# Patient Record
Sex: FEMALE | Race: WHITE | NOT HISPANIC OR LATINO | Employment: OTHER | URBAN - METROPOLITAN AREA
[De-identification: names, ages, dates, MRNs, and addresses within clinical notes are randomized per-mention and may not be internally consistent; named-entity substitution may affect disease eponyms.]

---

## 2017-01-05 ENCOUNTER — HISTORICAL (OUTPATIENT)
Dept: LAB | Facility: HOSPITAL | Age: 77
End: 2017-01-05

## 2017-02-07 ENCOUNTER — HISTORICAL (OUTPATIENT)
Dept: LAB | Facility: HOSPITAL | Age: 77
End: 2017-02-07

## 2017-03-06 ENCOUNTER — HISTORICAL (OUTPATIENT)
Dept: LAB | Facility: HOSPITAL | Age: 77
End: 2017-03-06

## 2017-03-13 ENCOUNTER — HISTORICAL (OUTPATIENT)
Dept: LAB | Facility: HOSPITAL | Age: 77
End: 2017-03-13

## 2017-03-22 ENCOUNTER — HISTORICAL (OUTPATIENT)
Dept: LAB | Facility: HOSPITAL | Age: 77
End: 2017-03-22

## 2017-04-12 ENCOUNTER — HISTORICAL (OUTPATIENT)
Dept: LAB | Facility: HOSPITAL | Age: 77
End: 2017-04-12

## 2017-05-02 ENCOUNTER — HISTORICAL (OUTPATIENT)
Dept: LAB | Facility: HOSPITAL | Age: 77
End: 2017-05-02

## 2017-05-02 LAB
INR PPP: 2.52
PROTHROMBIN TIME: 25.7 SECOND(S) (ref 9–11.5)

## 2017-05-31 ENCOUNTER — HISTORICAL (OUTPATIENT)
Dept: LAB | Facility: HOSPITAL | Age: 77
End: 2017-05-31

## 2017-05-31 LAB
INR PPP: 1.98
PROTHROMBIN TIME: 20.1 SECOND(S) (ref 9–11.5)

## 2017-06-27 ENCOUNTER — HISTORICAL (OUTPATIENT)
Dept: LAB | Facility: HOSPITAL | Age: 77
End: 2017-06-27

## 2017-06-27 LAB
INR PPP: 2.69
PROTHROMBIN TIME: 27.5 SECOND(S) (ref 9–11.5)

## 2017-07-25 ENCOUNTER — HISTORICAL (OUTPATIENT)
Dept: LAB | Facility: HOSPITAL | Age: 77
End: 2017-07-25

## 2017-07-25 LAB
INR PPP: 3.52
PROTHROMBIN TIME: 36 SECOND(S) (ref 9–11.5)

## 2017-08-02 ENCOUNTER — HISTORICAL (OUTPATIENT)
Dept: LAB | Facility: HOSPITAL | Age: 77
End: 2017-08-02

## 2017-08-02 LAB
INR PPP: 1.32
PROTHROMBIN TIME: 13.3 SECOND(S) (ref 9–11.5)

## 2017-08-17 ENCOUNTER — HISTORICAL (OUTPATIENT)
Dept: LAB | Facility: HOSPITAL | Age: 77
End: 2017-08-17

## 2017-08-17 LAB
INR PPP: 1.86
PROTHROMBIN TIME: 18.9 SECOND(S) (ref 9–11.5)

## 2017-09-13 ENCOUNTER — HISTORICAL (OUTPATIENT)
Dept: LAB | Facility: HOSPITAL | Age: 77
End: 2017-09-13

## 2017-09-13 LAB
INR PPP: 1.33
PROTHROMBIN TIME: 13.6 SECOND(S) (ref 9–11.5)

## 2019-01-28 ENCOUNTER — HISTORICAL (OUTPATIENT)
Dept: LAB | Facility: HOSPITAL | Age: 79
End: 2019-01-28

## 2019-01-28 LAB
ALBUMIN SERPL-MCNC: 3.8 GM/DL (ref 3.4–5)
ALBUMIN/GLOB SERPL: 1 RATIO (ref 1.1–2)
ALP SERPL-CCNC: 61 UNIT/L (ref 46–116)
ALT SERPL-CCNC: 24 UNIT/L (ref 12–78)
AST SERPL-CCNC: 13 UNIT/L (ref 10–37)
BILIRUB SERPL-MCNC: 0.7 MG/DL (ref 0.2–1)
BILIRUBIN DIRECT+TOT PNL SERPL-MCNC: 0.22 MG/DL (ref 0–0.2)
BILIRUBIN DIRECT+TOT PNL SERPL-MCNC: 0.48 MG/DL
BUN SERPL-MCNC: 17 MG/DL (ref 7–18)
CALCIUM SERPL-MCNC: 9.3 MG/DL (ref 8.5–10.1)
CHLORIDE SERPL-SCNC: 103 MMOL/L (ref 98–107)
CHOLEST SERPL-MCNC: 193 MG/DL (ref 50–200)
CHOLEST/HDLC SERPL: 3 {RATIO} (ref 0–5)
CO2 SERPL-SCNC: 30.9 MMOL/L (ref 21–32)
CREAT SERPL-MCNC: 1.02 MG/DL (ref 0.55–1.02)
GLOBULIN SER-MCNC: 3.8 GM/DL (ref 2.4–3.5)
GLUCOSE SERPL-MCNC: 112 MG/DL (ref 74–106)
HDLC SERPL-MCNC: 64 MG/DL (ref 35–60)
LDLC SERPL CALC-MCNC: 112 MG/DL (ref 50–140)
POTASSIUM SERPL-SCNC: 4.5 MMOL/L (ref 3.5–5.1)
PROT SERPL-MCNC: 7.6 GM/DL (ref 6.4–8.2)
SODIUM SERPL-SCNC: 140 MMOL/L (ref 136–145)
TRIGL SERPL-MCNC: 84 MG/DL (ref 30–150)
TSH SERPL-ACNC: 1.27 MIU/ML (ref 0.35–3.75)
VLDLC SERPL CALC-MCNC: 17 MG/DL

## 2020-01-08 ENCOUNTER — HISTORICAL (OUTPATIENT)
Dept: LAB | Facility: HOSPITAL | Age: 80
End: 2020-01-08

## 2020-01-08 LAB
ALBUMIN SERPL-MCNC: 3.8 GM/DL (ref 3.2–4.6)
ALBUMIN/GLOB SERPL: 1 RATIO (ref 1.1–2)
ALP SERPL-CCNC: 61 UNIT/L (ref 40–150)
ALT SERPL-CCNC: 15 UNIT/L (ref 0–55)
AST SERPL-CCNC: 17 UNIT/L (ref 5–34)
BILIRUB SERPL-MCNC: 0.7 MG/DL
BILIRUBIN DIRECT+TOT PNL SERPL-MCNC: 0.3 MG/DL (ref 0–0.5)
BILIRUBIN DIRECT+TOT PNL SERPL-MCNC: 0.4 MG/DL
BUN SERPL-MCNC: 22 MG/DL (ref 9.8–20.1)
CALCIUM SERPL-MCNC: 9.6 MG/DL (ref 8.4–10.2)
CHLORIDE SERPL-SCNC: 104 MMOL/L (ref 98–107)
CHOLEST SERPL-MCNC: 220 MG/DL
CHOLEST/HDLC SERPL: 4 {RATIO} (ref 0–5)
CO2 SERPL-SCNC: 28 MEQ/L (ref 23–31)
CREAT SERPL-MCNC: 0.96 MG/DL (ref 0.55–1.02)
GLOBULIN SER-MCNC: 3.9 GM/DL (ref 2.4–3.5)
GLUCOSE SERPL-MCNC: 114 MG/DL (ref 82–115)
HDLC SERPL-MCNC: 60 MG/DL
LDLC SERPL CALC-MCNC: 140 MG/DL (ref 50–140)
POTASSIUM SERPL-SCNC: 4.4 MMOL/L (ref 3.5–5.1)
PROT SERPL-MCNC: 7.7 GM/DL (ref 5.8–7.6)
SODIUM SERPL-SCNC: 141 MMOL/L (ref 136–145)
TRIGL SERPL-MCNC: 99 MG/DL (ref 37–140)
TSH SERPL-ACNC: 2.58 UIU/ML (ref 0.35–4.94)
VLDLC SERPL CALC-MCNC: 20 MG/DL

## 2020-06-11 ENCOUNTER — HISTORICAL (OUTPATIENT)
Dept: LAB | Facility: HOSPITAL | Age: 80
End: 2020-06-11

## 2020-06-11 LAB
ALBUMIN SERPL-MCNC: 3.9 GM/DL (ref 3.4–4.8)
ALP SERPL-CCNC: 72 UNIT/L (ref 40–150)
ALT SERPL-CCNC: 14 UNIT/L (ref 0–55)
AST SERPL-CCNC: 15 UNIT/L (ref 5–34)
BILIRUB SERPL-MCNC: 1 MG/DL
BILIRUBIN DIRECT+TOT PNL SERPL-MCNC: 0.4 MG/DL (ref 0–0.5)
BILIRUBIN DIRECT+TOT PNL SERPL-MCNC: 0.6 MG/DL
CHOLEST SERPL-MCNC: 189 MG/DL
CHOLEST/HDLC SERPL: 3 {RATIO} (ref 0–5)
HDLC SERPL-MCNC: 64 MG/DL (ref 35–60)
LDLC SERPL CALC-MCNC: 106 MG/DL (ref 50–140)
PROT SERPL-MCNC: 7.5 GM/DL (ref 5.8–7.6)
TRIGL SERPL-MCNC: 95 MG/DL (ref 37–140)
VLDLC SERPL CALC-MCNC: 19 MG/DL

## 2020-12-07 ENCOUNTER — HISTORICAL (OUTPATIENT)
Dept: RADIOLOGY | Facility: HOSPITAL | Age: 80
End: 2020-12-07

## 2021-08-17 ENCOUNTER — HISTORICAL (OUTPATIENT)
Dept: LAB | Facility: HOSPITAL | Age: 81
End: 2021-08-17

## 2021-08-17 LAB — SARS-COV-2 AG RESP QL IA.RAPID: POSITIVE

## 2021-08-19 ENCOUNTER — HISTORICAL (OUTPATIENT)
Dept: ADMINISTRATIVE | Facility: HOSPITAL | Age: 81
End: 2021-08-19

## 2021-12-23 ENCOUNTER — HISTORICAL (OUTPATIENT)
Dept: LAB | Facility: HOSPITAL | Age: 81
End: 2021-12-23

## 2021-12-23 LAB
APPEARANCE, UA: ABNORMAL
BACTERIA SPEC CULT: ABNORMAL
BILIRUB UR QL STRIP: NEGATIVE
COLOR UR: YELLOW
GLUCOSE (UA): NEGATIVE
HGB UR QL STRIP: NEGATIVE
KETONES UR QL STRIP: NEGATIVE
LEUKOCYTE ESTERASE UR QL STRIP: ABNORMAL
NITRITE UR QL STRIP: POSITIVE
PH UR STRIP: 7 [PH] (ref 4.6–8)
PROT UR QL STRIP: NEGATIVE
RBC #/AREA URNS HPF: ABNORMAL /[HPF]
SP GR UR STRIP: 1.01 (ref 1–1.03)
SQUAMOUS EPITHELIAL, UA: ABNORMAL
UROBILINOGEN UR STRIP-ACNC: 0.2
WBC #/AREA URNS HPF: ABNORMAL /HPF

## 2022-04-11 ENCOUNTER — HISTORICAL (OUTPATIENT)
Dept: ADMINISTRATIVE | Facility: HOSPITAL | Age: 82
End: 2022-04-11

## 2022-04-29 VITALS
OXYGEN SATURATION: 96 % | BODY MASS INDEX: 46.79 KG/M2 | DIASTOLIC BLOOD PRESSURE: 88 MMHG | HEIGHT: 64 IN | WEIGHT: 274.06 LBS | SYSTOLIC BLOOD PRESSURE: 148 MMHG

## 2022-08-03 ENCOUNTER — LAB VISIT (OUTPATIENT)
Dept: LAB | Facility: HOSPITAL | Age: 82
End: 2022-08-03
Attending: FAMILY MEDICINE
Payer: MEDICARE

## 2022-08-03 DIAGNOSIS — I10 HYPERTENSION, ESSENTIAL: Primary | ICD-10-CM

## 2022-08-03 LAB
ALBUMIN SERPL-MCNC: 4 GM/DL (ref 3.4–4.8)
ALBUMIN/GLOB SERPL: 1.1 RATIO (ref 1.1–2)
ALP SERPL-CCNC: 72 UNIT/L (ref 40–150)
ALT SERPL-CCNC: 12 UNIT/L (ref 0–55)
AST SERPL-CCNC: 14 UNIT/L (ref 5–34)
BILIRUBIN DIRECT+TOT PNL SERPL-MCNC: 1 MG/DL
BUN SERPL-MCNC: 25 MG/DL (ref 9.8–20.1)
CALCIUM SERPL-MCNC: 9.5 MG/DL (ref 8.4–10.2)
CHLORIDE SERPL-SCNC: 104 MMOL/L (ref 98–107)
CHOLEST SERPL-MCNC: 186 MG/DL
CHOLEST/HDLC SERPL: 3 {RATIO} (ref 0–5)
CO2 SERPL-SCNC: 27 MMOL/L (ref 23–31)
CREAT SERPL-MCNC: 1.03 MG/DL (ref 0.55–1.02)
GLOBULIN SER-MCNC: 3.6 GM/DL (ref 2.4–3.5)
GLUCOSE SERPL-MCNC: 112 MG/DL (ref 82–115)
HDLC SERPL-MCNC: 58 MG/DL (ref 35–60)
LDLC SERPL CALC-MCNC: 114 MG/DL (ref 50–140)
POTASSIUM SERPL-SCNC: 4.5 MMOL/L (ref 3.5–5.1)
PROT SERPL-MCNC: 7.6 GM/DL (ref 5.8–7.6)
SODIUM SERPL-SCNC: 141 MMOL/L (ref 136–145)
TRIGL SERPL-MCNC: 69 MG/DL (ref 37–140)
TSH SERPL-ACNC: 2.02 UIU/ML (ref 0.35–4.94)
VLDLC SERPL CALC-MCNC: 14 MG/DL

## 2022-08-03 PROCEDURE — 84443 ASSAY THYROID STIM HORMONE: CPT

## 2022-08-03 PROCEDURE — 80053 COMPREHEN METABOLIC PANEL: CPT

## 2022-08-03 PROCEDURE — 80061 LIPID PANEL: CPT

## 2022-08-03 PROCEDURE — 36415 COLL VENOUS BLD VENIPUNCTURE: CPT

## 2022-11-01 ENCOUNTER — OFFICE VISIT (OUTPATIENT)
Dept: FAMILY MEDICINE | Facility: CLINIC | Age: 82
End: 2022-11-01
Payer: MEDICARE

## 2022-11-01 VITALS
WEIGHT: 266.81 LBS | HEIGHT: 61 IN | BODY MASS INDEX: 50.37 KG/M2 | DIASTOLIC BLOOD PRESSURE: 72 MMHG | OXYGEN SATURATION: 97 % | RESPIRATION RATE: 18 BRPM | TEMPERATURE: 97 F | HEART RATE: 84 BPM | SYSTOLIC BLOOD PRESSURE: 177 MMHG

## 2022-11-01 DIAGNOSIS — Z91.81 AT RISK FOR FALLS: ICD-10-CM

## 2022-11-01 DIAGNOSIS — I10 ESSENTIAL HYPERTENSION: ICD-10-CM

## 2022-11-01 DIAGNOSIS — I48.91 ATRIAL FIBRILLATION, UNSPECIFIED TYPE: Primary | ICD-10-CM

## 2022-11-01 DIAGNOSIS — G47.00 INSOMNIA, UNSPECIFIED TYPE: Chronic | ICD-10-CM

## 2022-11-01 DIAGNOSIS — I25.10 CORONARY ARTERIOSCLEROSIS: ICD-10-CM

## 2022-11-01 PROBLEM — D68.9 BLEEDING DISORDER DUE TO CONSUMPTION OF COAGULANTS: Status: ACTIVE | Noted: 2022-11-01

## 2022-11-01 PROBLEM — E66.01 MORBID OBESITY: Status: ACTIVE | Noted: 2022-11-01

## 2022-11-01 PROBLEM — S99.921A INJURY OF RIGHT FOOT: Status: ACTIVE | Noted: 2022-11-01

## 2022-11-01 PROBLEM — M79.676 PAIN OF TOE: Status: ACTIVE | Noted: 2022-11-01

## 2022-11-01 PROBLEM — S91.119A LACERATION OF TOE: Status: ACTIVE | Noted: 2022-11-01

## 2022-11-01 PROBLEM — T45.7X1A BLEEDING DISORDER DUE TO CONSUMPTION OF COAGULANTS: Status: ACTIVE | Noted: 2022-11-01

## 2022-11-01 PROBLEM — G45.9 TRANSIENT ISCHEMIC ATTACK: Status: ACTIVE | Noted: 2019-01-25

## 2022-11-01 PROCEDURE — 1126F AMNT PAIN NOTED NONE PRSNT: CPT | Mod: CPTII,,, | Performed by: FAMILY MEDICINE

## 2022-11-01 PROCEDURE — 1101F PR PT FALLS ASSESS DOC 0-1 FALLS W/OUT INJ PAST YR: ICD-10-PCS | Mod: CPTII,,, | Performed by: FAMILY MEDICINE

## 2022-11-01 PROCEDURE — 1101F PT FALLS ASSESS-DOCD LE1/YR: CPT | Mod: CPTII,,, | Performed by: FAMILY MEDICINE

## 2022-11-01 PROCEDURE — 3078F DIAST BP <80 MM HG: CPT | Mod: CPTII,,, | Performed by: FAMILY MEDICINE

## 2022-11-01 PROCEDURE — 3288F FALL RISK ASSESSMENT DOCD: CPT | Mod: CPTII,,, | Performed by: FAMILY MEDICINE

## 2022-11-01 PROCEDURE — 3288F PR FALLS RISK ASSESSMENT DOCUMENTED: ICD-10-PCS | Mod: CPTII,,, | Performed by: FAMILY MEDICINE

## 2022-11-01 PROCEDURE — 1160F RVW MEDS BY RX/DR IN RCRD: CPT | Mod: CPTII,,, | Performed by: FAMILY MEDICINE

## 2022-11-01 PROCEDURE — 1126F PR PAIN SEVERITY QUANTIFIED, NO PAIN PRESENT: ICD-10-PCS | Mod: CPTII,,, | Performed by: FAMILY MEDICINE

## 2022-11-01 PROCEDURE — 3078F PR MOST RECENT DIASTOLIC BLOOD PRESSURE < 80 MM HG: ICD-10-PCS | Mod: CPTII,,, | Performed by: FAMILY MEDICINE

## 2022-11-01 PROCEDURE — 99204 PR OFFICE/OUTPT VISIT, NEW, LEVL IV, 45-59 MIN: ICD-10-PCS | Mod: ,,, | Performed by: FAMILY MEDICINE

## 2022-11-01 PROCEDURE — 1159F PR MEDICATION LIST DOCUMENTED IN MEDICAL RECORD: ICD-10-PCS | Mod: CPTII,,, | Performed by: FAMILY MEDICINE

## 2022-11-01 PROCEDURE — 3077F PR MOST RECENT SYSTOLIC BLOOD PRESSURE >= 140 MM HG: ICD-10-PCS | Mod: CPTII,,, | Performed by: FAMILY MEDICINE

## 2022-11-01 PROCEDURE — 1159F MED LIST DOCD IN RCRD: CPT | Mod: CPTII,,, | Performed by: FAMILY MEDICINE

## 2022-11-01 PROCEDURE — 99204 OFFICE O/P NEW MOD 45 MIN: CPT | Mod: ,,, | Performed by: FAMILY MEDICINE

## 2022-11-01 PROCEDURE — 3077F SYST BP >= 140 MM HG: CPT | Mod: CPTII,,, | Performed by: FAMILY MEDICINE

## 2022-11-01 PROCEDURE — 1160F PR REVIEW ALL MEDS BY PRESCRIBER/CLIN PHARMACIST DOCUMENTED: ICD-10-PCS | Mod: CPTII,,, | Performed by: FAMILY MEDICINE

## 2022-11-01 RX ORDER — VERAPAMIL HYDROCHLORIDE 240 MG/1
480 TABLET, FILM COATED, EXTENDED RELEASE ORAL DAILY
Qty: 180 TABLET | Refills: 1 | Status: SHIPPED | OUTPATIENT
Start: 2022-11-01 | End: 2023-04-10

## 2022-11-01 RX ORDER — VERAPAMIL HYDROCHLORIDE 240 MG/1
480 TABLET, FILM COATED, EXTENDED RELEASE ORAL DAILY
COMMUNITY
Start: 2022-10-10 | End: 2022-11-01 | Stop reason: SDUPTHER

## 2022-11-01 RX ORDER — CLONAZEPAM 0.5 MG/1
0.5 TABLET ORAL NIGHTLY
Qty: 30 TABLET | Refills: 5 | Status: SHIPPED | OUTPATIENT
Start: 2022-11-01 | End: 2023-05-26

## 2022-11-01 RX ORDER — MELOXICAM 15 MG/1
15 TABLET ORAL DAILY PRN
COMMUNITY
Start: 2022-10-20 | End: 2022-11-01 | Stop reason: SDUPTHER

## 2022-11-01 RX ORDER — LOSARTAN POTASSIUM 100 MG/1
100 TABLET ORAL DAILY
Qty: 90 TABLET | Refills: 1 | Status: SHIPPED | OUTPATIENT
Start: 2022-11-01 | End: 2023-04-24

## 2022-11-01 RX ORDER — FUROSEMIDE 40 MG/1
40 TABLET ORAL EVERY MORNING
Qty: 90 TABLET | Refills: 1 | Status: SHIPPED | OUTPATIENT
Start: 2022-11-01 | End: 2023-03-28

## 2022-11-01 RX ORDER — DEXBROMPHENIRAMINE MALEATE AND PHENYLEPHRINE HYDROCHLORIDE 2; 10 MG/1; MG/1
1 TABLET ORAL 2 TIMES DAILY PRN
COMMUNITY
Start: 2022-09-24 | End: 2023-06-19

## 2022-11-01 RX ORDER — RIVAROXABAN 20 MG/1
20 TABLET, FILM COATED ORAL DAILY
COMMUNITY
Start: 2022-10-10 | End: 2022-11-01 | Stop reason: SDUPTHER

## 2022-11-01 RX ORDER — RIVAROXABAN 20 MG/1
20 TABLET, FILM COATED ORAL DAILY
Qty: 90 TABLET | Refills: 1 | Status: SHIPPED | OUTPATIENT
Start: 2022-11-01 | End: 2023-04-24 | Stop reason: ALTCHOICE

## 2022-11-01 RX ORDER — MELOXICAM 15 MG/1
15 TABLET ORAL DAILY PRN
Qty: 90 TABLET | Refills: 1 | Status: SHIPPED | OUTPATIENT
Start: 2022-11-01 | End: 2023-04-24 | Stop reason: ALTCHOICE

## 2022-11-01 RX ORDER — FUROSEMIDE 40 MG/1
40 TABLET ORAL EVERY MORNING
COMMUNITY
Start: 2022-09-30 | End: 2022-11-01 | Stop reason: SDUPTHER

## 2022-11-01 RX ORDER — CLONAZEPAM 0.5 MG/1
0.5 TABLET ORAL NIGHTLY
COMMUNITY
Start: 2022-10-06 | End: 2022-11-01 | Stop reason: SDUPTHER

## 2022-11-01 RX ORDER — LOSARTAN POTASSIUM 100 MG/1
100 TABLET ORAL DAILY
COMMUNITY
Start: 2022-10-04 | End: 2022-11-01 | Stop reason: SDUPTHER

## 2022-11-01 NOTE — PROGRESS NOTES
Patient ID: [id number]     Chief Complaint: Establish Care, Medication Refill, and Leg Swelling (BLE)        HPI:     Gwendolyn Trejo is a 82 y.o. female here today for Establish Care, Medication Refill, and Leg Swelling (BLE). Doing well overall. Fell several weeks ago but is feeling ok today with no residual symptoms.       ----------------------------  Anxiety disorder, unspecified  Aortic stenosis  Atrial fibrillation  CAD (coronary artery disease)  Coronary arteriosclerosis  Crushing injury of right foot  Diastolic dysfunction  History of Coumadin therapy  HTN (hypertension)  Inflamed seborrheic keratosis  Insomnia  Lumbar radiculopathy  Mitral regurgitation  Neovascular age-related macular degeneration  Pseudophakia of both eyes  Sciatica  Transient cerebral ischemia     Past Surgical History:   Procedure Laterality Date    ANKLE GANGLION CYST EXCISION Left     CARDIAC CATHETERIZATION  2013    Dr Yadav    CATARACT EXTRACTION Bilateral      SECTION       SECTION         Review of patient's allergies indicates:   Allergen Reactions    Penicillins Hives       Outpatient Medications Marked as Taking for the 22 encounter (Office Visit) with Estuardo Soriano, DO   Medication Sig Dispense Refill    [DISCONTINUED] clonazePAM (KLONOPIN) 0.5 MG tablet Take 0.5 mg by mouth every evening.      [DISCONTINUED] furosemide (LASIX) 40 MG tablet Take 40 mg by mouth every morning.      [DISCONTINUED] losartan (COZAAR) 100 MG tablet Take 100 mg by mouth once daily.      [DISCONTINUED] meloxicam (MOBIC) 15 MG tablet Take 15 mg by mouth daily as needed.      [DISCONTINUED] verapamiL (CALAN-SR) 240 MG CR tablet Take 480 mg by mouth once daily.      [DISCONTINUED] XARELTO 20 mg Tab Take 20 mg by mouth once daily.         Social History     Socioeconomic History    Marital status:    Tobacco Use    Smoking status: Never    Smokeless tobacco: Never   Substance and Sexual Activity    Alcohol use:  "Never    Drug use: Never    Sexual activity: Not Currently        Family History   Problem Relation Age of Onset    Heart failure Mother     Parkinsonism Father     Valvular heart disease Sister         infant - cause of death        Patient Care Team:  Sudhakar Claudio MD as PCP - General (Family Medicine)  Salbador Scott Jr., MD as Consulting Physician (Ophthalmology)  Daniele Marie MD as Consulting Physician (Cardiology)  Jose Yadav MD as Consulting Physician (Vascular Surgery)     Subjective:     Review of Systems   Respiratory:  Negative for shortness of breath.    Cardiovascular:  Positive for leg swelling. Negative for chest pain.   Musculoskeletal:  Positive for falls.   Neurological:  Negative for dizziness and headaches.     See HPI for details  All Other ROS: Negative except as stated in HPI.       Objective:     BP (!) 177/72   Pulse 84   Temp 97.2 °F (36.2 °C) (Tympanic)   Resp 18   Ht 5' 1.42" (1.56 m)   Wt 121 kg (266 lb 12.8 oz)   SpO2 97%   BMI 49.73 kg/m²     Physical Exam  Vitals reviewed.   Constitutional:       General: She is not in acute distress.     Appearance: Normal appearance.   Cardiovascular:      Rate and Rhythm: Normal rate and regular rhythm.      Pulses: Normal pulses.      Heart sounds: Normal heart sounds. No murmur heard.    No friction rub. No gallop.   Pulmonary:      Effort: No respiratory distress.      Breath sounds: No wheezing, rhonchi or rales.   Musculoskeletal:         General: No swelling, tenderness or deformity.      Right lower leg: No edema.      Left lower leg: No edema.   Skin:     General: Skin is warm and dry.      Findings: No lesion or rash.   Neurological:      General: No focal deficit present.      Mental Status: She is alert.   Psychiatric:         Mood and Affect: Mood normal.       Assessment/Plan:     1. Atrial fibrillation, unspecified type  -     verapamiL (CALAN-SR) 240 MG CR tablet; Take 2 tablets (480 mg total) by mouth " once daily.  Dispense: 180 tablet; Refill: 1  -     XARELTO 20 mg Tab; Take 1 tablet (20 mg total) by mouth once daily.  Dispense: 90 tablet; Refill: 1    2. Essential hypertension  -     losartan (COZAAR) 100 MG tablet; Take 1 tablet (100 mg total) by mouth once daily.  Dispense: 90 tablet; Refill: 1  -     furosemide (LASIX) 40 MG tablet; Take 1 tablet (40 mg total) by mouth every morning.  Dispense: 90 tablet; Refill: 1    3. Coronary arteriosclerosis  -     XARELTO 20 mg Tab; Take 1 tablet (20 mg total) by mouth once daily.  Dispense: 90 tablet; Refill: 1    4. At risk for falls  -Will continue to monitor for frequency of falls. Advised patient to minimize use of Meloxicam as it places her at increased risk for GI bleeding as well as generalized bleeding while also on Xeralto.   5. Insomnia, unspecified type  -     clonazePAM (KLONOPIN) 0.5 MG tablet; Take 1 tablet (0.5 mg total) by mouth every evening.  Dispense: 30 tablet; Refill: 5    Other orders  -     meloxicam (MOBIC) 15 MG tablet; Take 1 tablet (15 mg total) by mouth daily as needed for Pain.  Dispense: 90 tablet; Refill: 1        Follow up:     Follow up in about 6 months (around 5/1/2023) for Follow up. In addition to their scheduled follow up, the patient has also been instructed to follow up on as needed basis.

## 2022-11-02 NOTE — PROGRESS NOTES
Patient, Gwendolyn Trejo (MRN #[id number]), presented with a recorded BMI of 49.73 kg/m^2 consistent with the definition of morbid obesity (ICD-10 E66.01). The patient's morbid obesity was monitored, evaluated, addressed and/or treated. This addendum to the medical record is made on 11/02/2022.

## 2023-02-06 PROBLEM — G45.9 TRANSIENT ISCHEMIC ATTACK: Status: RESOLVED | Noted: 2019-01-25 | Resolved: 2023-02-06

## 2023-02-13 ENCOUNTER — OFFICE VISIT (OUTPATIENT)
Dept: FAMILY MEDICINE | Facility: CLINIC | Age: 83
End: 2023-02-13
Payer: MEDICARE

## 2023-02-13 VITALS
SYSTOLIC BLOOD PRESSURE: 150 MMHG | BODY MASS INDEX: 50.26 KG/M2 | WEIGHT: 266.19 LBS | DIASTOLIC BLOOD PRESSURE: 59 MMHG | HEIGHT: 61 IN | TEMPERATURE: 97 F | OXYGEN SATURATION: 95 % | RESPIRATION RATE: 16 BRPM | HEART RATE: 60 BPM

## 2023-02-13 DIAGNOSIS — I10 ESSENTIAL HYPERTENSION: ICD-10-CM

## 2023-02-13 DIAGNOSIS — I48.20 CHRONIC ATRIAL FIBRILLATION: Primary | ICD-10-CM

## 2023-02-13 DIAGNOSIS — M54.50 LOW BACK PAIN, UNSPECIFIED BACK PAIN LATERALITY, UNSPECIFIED CHRONICITY, UNSPECIFIED WHETHER SCIATICA PRESENT: ICD-10-CM

## 2023-02-13 DIAGNOSIS — R06.09 DYSPNEA ON EXERTION: ICD-10-CM

## 2023-02-13 DIAGNOSIS — E78.2 MIXED HYPERLIPIDEMIA: ICD-10-CM

## 2023-02-13 PROCEDURE — 3077F PR MOST RECENT SYSTOLIC BLOOD PRESSURE >= 140 MM HG: ICD-10-PCS | Mod: CPTII,,, | Performed by: FAMILY MEDICINE

## 2023-02-13 PROCEDURE — 3288F PR FALLS RISK ASSESSMENT DOCUMENTED: ICD-10-PCS | Mod: CPTII,,, | Performed by: FAMILY MEDICINE

## 2023-02-13 PROCEDURE — 99214 OFFICE O/P EST MOD 30 MIN: CPT | Mod: ,,, | Performed by: FAMILY MEDICINE

## 2023-02-13 PROCEDURE — 1159F MED LIST DOCD IN RCRD: CPT | Mod: CPTII,,, | Performed by: FAMILY MEDICINE

## 2023-02-13 PROCEDURE — 3077F SYST BP >= 140 MM HG: CPT | Mod: CPTII,,, | Performed by: FAMILY MEDICINE

## 2023-02-13 PROCEDURE — 3078F DIAST BP <80 MM HG: CPT | Mod: CPTII,,, | Performed by: FAMILY MEDICINE

## 2023-02-13 PROCEDURE — 1125F AMNT PAIN NOTED PAIN PRSNT: CPT | Mod: CPTII,,, | Performed by: FAMILY MEDICINE

## 2023-02-13 PROCEDURE — 3288F FALL RISK ASSESSMENT DOCD: CPT | Mod: CPTII,,, | Performed by: FAMILY MEDICINE

## 2023-02-13 PROCEDURE — 1159F PR MEDICATION LIST DOCUMENTED IN MEDICAL RECORD: ICD-10-PCS | Mod: CPTII,,, | Performed by: FAMILY MEDICINE

## 2023-02-13 PROCEDURE — 1101F PT FALLS ASSESS-DOCD LE1/YR: CPT | Mod: CPTII,,, | Performed by: FAMILY MEDICINE

## 2023-02-13 PROCEDURE — 1101F PR PT FALLS ASSESS DOC 0-1 FALLS W/OUT INJ PAST YR: ICD-10-PCS | Mod: CPTII,,, | Performed by: FAMILY MEDICINE

## 2023-02-13 PROCEDURE — 3078F PR MOST RECENT DIASTOLIC BLOOD PRESSURE < 80 MM HG: ICD-10-PCS | Mod: CPTII,,, | Performed by: FAMILY MEDICINE

## 2023-02-13 PROCEDURE — 1125F PR PAIN SEVERITY QUANTIFIED, PAIN PRESENT: ICD-10-PCS | Mod: CPTII,,, | Performed by: FAMILY MEDICINE

## 2023-02-13 PROCEDURE — 99214 PR OFFICE/OUTPT VISIT, EST, LEVL IV, 30-39 MIN: ICD-10-PCS | Mod: ,,, | Performed by: FAMILY MEDICINE

## 2023-02-13 NOTE — PROGRESS NOTES
Patient ID: [id number]     Chief Complaint: Hypertension (Elevated recent BP's, popped blood vessel in eye - Dr Scott stated due to BP and not eye related) and Urinary Tract Infection (Poss UTI, c/o low back pain)        HPI:     Gwendolyn Trejo is a 82 y.o. female here today for Hypertension (Elevated recent BP's, popped blood vessel in eye - Dr Scott stated due to BP and not eye related) and Urinary Tract Infection (Poss UTI, c/o low back pain).       ----------------------------  Anxiety disorder, unspecified  Aortic stenosis  Atrial fibrillation  CAD (coronary artery disease)  Coronary arteriosclerosis  Crushing injury of right foot  Diastolic dysfunction  History of Coumadin therapy  HTN (hypertension)  Inflamed seborrheic keratosis  Insomnia  Lumbar radiculopathy  Mitral regurgitation  Neovascular age-related macular degeneration  Pseudophakia of both eyes  Sciatica  Transient cerebral ischemia     Past Surgical History:   Procedure Laterality Date    ANKLE GANGLION CYST EXCISION Left     CARDIAC CATHETERIZATION  2013    Dr Yadav    CATARACT EXTRACTION Bilateral      SECTION       SECTION         Review of patient's allergies indicates:   Allergen Reactions    Penicillins Hives       Outpatient Medications Marked as Taking for the 23 encounter (Office Visit) with Estuardo Soriano, DO   Medication Sig Dispense Refill    clonazePAM (KLONOPIN) 0.5 MG tablet Take 1 tablet (0.5 mg total) by mouth every evening. 30 tablet 5    dexbrompheniramine-phenyleph (ALA-HIST PE) 2-10 mg Tab Take 1 tablet by mouth 2 (two) times daily as needed.      furosemide (LASIX) 40 MG tablet Take 1 tablet (40 mg total) by mouth every morning. 90 tablet 1    losartan (COZAAR) 100 MG tablet Take 1 tablet (100 mg total) by mouth once daily. 90 tablet 1    meloxicam (MOBIC) 15 MG tablet Take 1 tablet (15 mg total) by mouth daily as needed for Pain. 90 tablet 1    verapamiL (CALAN-SR) 240 MG CR tablet Take 2  "tablets (480 mg total) by mouth once daily. 180 tablet 1    XARELTO 20 mg Tab Take 1 tablet (20 mg total) by mouth once daily. 90 tablet 1       Social History     Socioeconomic History    Marital status:    Tobacco Use    Smoking status: Never    Smokeless tobacco: Never   Substance and Sexual Activity    Alcohol use: Never    Drug use: Never    Sexual activity: Not Currently        Family History   Problem Relation Age of Onset    Heart failure Mother     Parkinsonism Father     Valvular heart disease Sister         infant - cause of death        Patient Care Team:  Estuardo Soriano DO as PCP - General (Family Medicine)  Salbador Scott Jr., MD as Consulting Physician (Ophthalmology)  Daniele Marie MD as Consulting Physician (Cardiology)  Jose Yadav MD as Consulting Physician (Vascular Surgery)     Subjective:     Review of Systems   Constitutional:  Negative for chills and fever.   Respiratory:  Positive for shortness of breath.    Cardiovascular:  Negative for chest pain and palpitations.   Gastrointestinal:  Negative for constipation and diarrhea.   Musculoskeletal:  Positive for back pain. Negative for falls.   Neurological:  Negative for dizziness and headaches.   Psychiatric/Behavioral:  The patient does not have insomnia.      See HPI for details  All Other ROS: Negative except as stated in HPI.       Objective:     BP (!) 150/59   Pulse 60   Temp 97.2 °F (36.2 °C) (Tympanic)   Resp 16   Ht 5' 1.42" (1.56 m)   Wt 120.7 kg (266 lb 3.2 oz)   SpO2 95%   BMI 49.62 kg/m²     Physical Exam  Vitals reviewed.   Constitutional:       General: She is not in acute distress.     Appearance: Normal appearance.   Cardiovascular:      Rate and Rhythm: Normal rate and regular rhythm.      Heart sounds: No murmur heard.    No friction rub. No gallop.   Pulmonary:      Effort: No respiratory distress.      Breath sounds: No wheezing, rhonchi or rales.   Musculoskeletal:         General: No " swelling, tenderness or deformity.      Right lower leg: Edema present.      Left lower leg: Edema present.   Skin:     General: Skin is warm and dry.      Findings: No lesion or rash.   Neurological:      General: No focal deficit present.      Mental Status: She is alert.   Psychiatric:         Mood and Affect: Mood normal.       Assessment/Plan:     1. Chronic atrial fibrillation    2. Essential hypertension  -     CBC Auto Differential; Future; Expected date: 02/13/2023  -     Comprehensive Metabolic Panel; Future; Expected date: 02/13/2023  -     TSH; Future; Expected date: 02/13/2023    3. Mixed hyperlipidemia  -     Lipid Panel; Future; Expected date: 02/13/2023    4. Dyspnea on exertion  -     Echo; Future; Expected date: 02/13/2023    5. Low back pain, unspecified back pain laterality, unspecified chronicity, unspecified whether sciatica present  -     Urinalysis, Reflex to Urine Culture; Future; Expected date: 02/13/2023      Will review patient's upcoming labs and start on medication for HTN. Also advised patient to try moving the losartan to at night. May start low dose spironolactone if potassium is within within normal limits or low.     Discussed possible causes of elevated BP to be valvular disease or untreated sleep apnea.     Follow up:     Follow up in about 4 weeks (around 3/13/2023) for Follow up Hypertension. In addition to their scheduled follow up, the patient has also been instructed to follow up on as needed basis.          (3) adequate

## 2023-02-14 ENCOUNTER — TELEPHONE (OUTPATIENT)
Dept: FAMILY MEDICINE | Facility: CLINIC | Age: 83
End: 2023-02-14
Payer: MEDICARE

## 2023-02-14 ENCOUNTER — HOSPITAL ENCOUNTER (OUTPATIENT)
Dept: CARDIOLOGY | Facility: HOSPITAL | Age: 83
Discharge: HOME | End: 2023-02-14
Attending: FAMILY MEDICINE
Payer: MEDICARE

## 2023-02-14 DIAGNOSIS — R06.09 DYSPNEA ON EXERTION: ICD-10-CM

## 2023-02-14 LAB
AORTIC ROOT ANNULUS: 3.05 CM
AORTIC VALVE CUSP SEPERATION: 0.68 CM
AV PEAK GRADIENT: 60 MMHG
AV VELOCITY RATIO: 0.22
CV ECHO LV RWT: 0.42 CM
DOP CALC AO PEAK VEL: 3.86 M/S
DOP CALC LVOT AREA: 2.3 CM2
DOP CALC LVOT DIAMETER: 1.73 CM
DOP CALC LVOT PEAK VEL: 0.86 M/S
E WAVE DECELERATION TIME: 237.7 MSEC
E/A RATIO: 3.32
ECHO LV POSTERIOR WALL: 1.05 CM (ref 0.6–1.1)
EJECTION FRACTION: 55 %
FRACTIONAL SHORTENING: 26 % (ref 28–44)
INTERVENTRICULAR SEPTUM: 1.04 CM (ref 0.6–1.1)
LEFT ATRIUM SIZE: 0 CM
LEFT INTERNAL DIMENSION IN SYSTOLE: 3.72 CM (ref 2.1–4)
LEFT VENTRICLE DIASTOLIC VOLUME: 118.1 ML
LEFT VENTRICLE SYSTOLIC VOLUME: 58.76 ML
LEFT VENTRICULAR INTERNAL DIMENSION IN DIASTOLE: 5 CM (ref 3.5–6)
LEFT VENTRICULAR MASS: 193.13 G
MV PEAK A VEL: 0.57 M/S
MV PEAK E VEL: 1.89 M/S
MV STENOSIS PRESSURE HALF TIME: 68.93 MS
MV VALVE AREA P 1/2 METHOD: 3.19 CM2
PISA MRMAX VEL: 5.64 M/S
PISA TR MAX VEL: 3.11 M/S
PV PEAK VELOCITY: 0.93 CM/S
RA PRESSURE: 8 MMHG
RIGHT VENTRICULAR END-DIASTOLIC DIMENSION: 4.43 CM
TR MAX PG: 39 MMHG
TRICUSPID VALVE PEAK A WAVE VELOCITY: 0 M/S
TV PEAK E VEL: 0.5 M/S
TV REST PULMONARY ARTERY PRESSURE: 47 MMHG
TV STENOSIS PRESSURE HALF TIME: 57.94 MS
TV VALVE AREA P 1/2 METHOD: 3.28 CM2

## 2023-02-14 PROCEDURE — 93306 TTE W/DOPPLER COMPLETE: CPT

## 2023-02-14 NOTE — TELEPHONE ENCOUNTER
----- Message from Estuardo Soriano DO sent at 2/14/2023 10:21 AM CST -----  Please inform patient of lab results.    1. Mildly reduced ejection fraction at 55%. Moderate mitral valve regurgitation. Moderate tricuspid regurgitation. Severe aortic stenosis.     2. As discussed at recent visit, valve replacement/repair surgery may be indicated. Can discuss possible referral at follow up.     3. Other labwork within acceptable ranges.    Thanks,    Dr. Soriano

## 2023-02-14 NOTE — TELEPHONE ENCOUNTER
Pt next visit 3/13/23 @ 0815, faxed echo to Dr Marie's office. Dee wants to know what to do about patient's UTI.

## 2023-02-15 DIAGNOSIS — M54.50 LOW BACK PAIN, UNSPECIFIED BACK PAIN LATERALITY, UNSPECIFIED CHRONICITY, UNSPECIFIED WHETHER SCIATICA PRESENT: Primary | ICD-10-CM

## 2023-02-15 RX ORDER — NITROFURANTOIN 25; 75 MG/1; MG/1
100 CAPSULE ORAL 2 TIMES DAILY
Qty: 10 CAPSULE | Refills: 0 | Status: SHIPPED | OUTPATIENT
Start: 2023-02-15 | End: 2023-02-20

## 2023-02-15 NOTE — PROGRESS NOTES
Patient, Gwendolyn Trejo (MRN #[id number]), presented with a recorded BMI of 49.62 kg/m^2 consistent with the definition of morbid obesity (ICD-10 E66.01). The patient's morbid obesity was monitored, evaluated, addressed and/or treated. This addendum to the medical record is made on 02/15/2023.

## 2023-02-20 ENCOUNTER — TELEPHONE (OUTPATIENT)
Dept: FAMILY MEDICINE | Facility: CLINIC | Age: 83
End: 2023-02-20
Payer: MEDICARE

## 2023-02-20 NOTE — TELEPHONE ENCOUNTER
----- Message from Estuardo Soriano DO sent at 2/20/2023  8:44 AM CST -----  Please inform patient of lab results.    1. Pansensitive E.coli. Antibiotic I prescribed should cover.     2.Kidney function a little down compared to 6 months ago, could be partially caused by UTI.  Recommend increase fluid intake a bit     3. Anemia likely chronic but unsure for how long, not low enough to transfuse. Will Repeat CBC and get further workup at follow up.     Thanks,    Dr. Soriano

## 2023-03-07 ENCOUNTER — LAB VISIT (OUTPATIENT)
Dept: LAB | Facility: HOSPITAL | Age: 83
End: 2023-03-07
Attending: FAMILY MEDICINE
Payer: MEDICARE

## 2023-03-07 ENCOUNTER — OFFICE VISIT (OUTPATIENT)
Dept: FAMILY MEDICINE | Facility: CLINIC | Age: 83
End: 2023-03-07
Payer: MEDICARE

## 2023-03-07 VITALS
HEART RATE: 84 BPM | RESPIRATION RATE: 20 BRPM | WEIGHT: 260.81 LBS | OXYGEN SATURATION: 95 % | TEMPERATURE: 98 F | HEIGHT: 62 IN | BODY MASS INDEX: 47.99 KG/M2 | DIASTOLIC BLOOD PRESSURE: 74 MMHG | SYSTOLIC BLOOD PRESSURE: 164 MMHG

## 2023-03-07 DIAGNOSIS — Z91.81 AT RISK FOR FALLS: ICD-10-CM

## 2023-03-07 DIAGNOSIS — R30.0 DYSURIA: ICD-10-CM

## 2023-03-07 DIAGNOSIS — D63.8 ANEMIA OF CHRONIC DISEASE: ICD-10-CM

## 2023-03-07 DIAGNOSIS — I48.20 CHRONIC ATRIAL FIBRILLATION: ICD-10-CM

## 2023-03-07 DIAGNOSIS — I10 ESSENTIAL HYPERTENSION: Primary | ICD-10-CM

## 2023-03-07 DIAGNOSIS — D50.9 IRON DEFICIENCY ANEMIA, UNSPECIFIED: Primary | ICD-10-CM

## 2023-03-07 LAB
APPEARANCE UR: ABNORMAL
BACTERIA #/AREA URNS AUTO: ABNORMAL /HPF
BASOPHILS # BLD AUTO: 0.09 X10(3)/MCL (ref 0–0.2)
BASOPHILS NFR BLD AUTO: 1.4 %
BILIRUB SERPL-MCNC: ABNORMAL MG/DL
BILIRUB UR QL STRIP.AUTO: NEGATIVE MG/DL
BLOOD URINE, POC: 5
CLARITY, POC UA: ABNORMAL
COLOR UR AUTO: YELLOW
COLOR, POC UA: ABNORMAL
EOSINOPHIL # BLD AUTO: 0.19 X10(3)/MCL (ref 0–0.9)
EOSINOPHIL NFR BLD AUTO: 3 %
ERYTHROCYTE [DISTWIDTH] IN BLOOD BY AUTOMATED COUNT: 16 % (ref 11.5–17)
GLUCOSE UR QL STRIP.AUTO: NEGATIVE MG/DL
GLUCOSE UR QL STRIP: ABNORMAL
HCT VFR BLD AUTO: 32.8 % (ref 37–47)
HGB BLD-MCNC: 9.7 G/DL (ref 12–16)
IMM GRANULOCYTES # BLD AUTO: 0.02 X10(3)/MCL (ref 0–0.04)
IMM GRANULOCYTES NFR BLD AUTO: 0.3 %
KETONES UR QL STRIP.AUTO: ABNORMAL MG/DL
KETONES UR QL STRIP: ABNORMAL
LEUKOCYTE ESTERASE UR QL STRIP.AUTO: ABNORMAL UNIT/L
LEUKOCYTE ESTERASE URINE, POC: 25
LYMPHOCYTES # BLD AUTO: 1.11 X10(3)/MCL (ref 0.6–4.6)
LYMPHOCYTES NFR BLD AUTO: 17.5 %
MCH RBC QN AUTO: 23.7 PG
MCHC RBC AUTO-ENTMCNC: 29.6 G/DL (ref 33–36)
MCV RBC AUTO: 80 FL (ref 80–94)
MONOCYTES # BLD AUTO: 0.58 X10(3)/MCL (ref 0.1–1.3)
MONOCYTES NFR BLD AUTO: 9.1 %
NEUTROPHILS # BLD AUTO: 4.35 X10(3)/MCL (ref 2.1–9.2)
NEUTROPHILS NFR BLD AUTO: 68.7 %
NITRITE UR QL STRIP.AUTO: NEGATIVE
NITRITE, POC UA: ABNORMAL
NRBC BLD AUTO-RTO: 0 %
PH UR STRIP.AUTO: 5.5 [PH]
PH, POC UA: 5
PLATELET # BLD AUTO: 310 X10(3)/MCL (ref 130–400)
PMV BLD AUTO: 9.7 FL (ref 7.4–10.4)
PROT UR QL STRIP.AUTO: NEGATIVE MG/DL
PROTEIN, POC: ABNORMAL
RBC # BLD AUTO: 4.1 X10(6)/MCL (ref 4.2–5.4)
RBC #/AREA URNS AUTO: ABNORMAL /HPF
RBC UR QL AUTO: ABNORMAL UNIT/L
SP GR UR STRIP.AUTO: 1.01
SPECIFIC GRAVITY, POC UA: 1.02
SQUAMOUS #/AREA URNS AUTO: ABNORMAL /HPF
UROBILINOGEN UR STRIP-ACNC: 1 MG/DL
UROBILINOGEN, POC UA: ABNORMAL
WBC # SPEC AUTO: 6.3 X10(3)/MCL (ref 4.5–11.5)
WBC #/AREA URNS AUTO: ABNORMAL /HPF
WBC CLUMPS UR QL AUTO: ABNORMAL /HPF

## 2023-03-07 PROCEDURE — 99214 OFFICE O/P EST MOD 30 MIN: CPT | Mod: ,,, | Performed by: FAMILY MEDICINE

## 2023-03-07 PROCEDURE — 99214 PR OFFICE/OUTPT VISIT, EST, LEVL IV, 30-39 MIN: ICD-10-PCS | Mod: ,,, | Performed by: FAMILY MEDICINE

## 2023-03-07 PROCEDURE — 87186 SC STD MICRODIL/AGAR DIL: CPT

## 2023-03-07 PROCEDURE — 1160F RVW MEDS BY RX/DR IN RCRD: CPT | Mod: CPTII,,, | Performed by: FAMILY MEDICINE

## 2023-03-07 PROCEDURE — 1126F PR PAIN SEVERITY QUANTIFIED, NO PAIN PRESENT: ICD-10-PCS | Mod: CPTII,,, | Performed by: FAMILY MEDICINE

## 2023-03-07 PROCEDURE — 81002 POCT URINE DIPSTICK WITHOUT MICROSCOPE: ICD-10-PCS | Mod: ,,, | Performed by: FAMILY MEDICINE

## 2023-03-07 PROCEDURE — 36415 COLL VENOUS BLD VENIPUNCTURE: CPT

## 2023-03-07 PROCEDURE — 83550 IRON BINDING TEST: CPT

## 2023-03-07 PROCEDURE — 85025 COMPLETE CBC W/AUTO DIFF WBC: CPT

## 2023-03-07 PROCEDURE — 85045 AUTOMATED RETICULOCYTE COUNT: CPT

## 2023-03-07 PROCEDURE — 1159F PR MEDICATION LIST DOCUMENTED IN MEDICAL RECORD: ICD-10-PCS | Mod: CPTII,,, | Performed by: FAMILY MEDICINE

## 2023-03-07 PROCEDURE — 82607 VITAMIN B-12: CPT

## 2023-03-07 PROCEDURE — 85060 BLOOD SMEAR INTERPRETATION: CPT

## 2023-03-07 PROCEDURE — 81002 URINALYSIS NONAUTO W/O SCOPE: CPT | Mod: ,,, | Performed by: FAMILY MEDICINE

## 2023-03-07 PROCEDURE — 87088 URINE BACTERIA CULTURE: CPT

## 2023-03-07 PROCEDURE — 82728 ASSAY OF FERRITIN: CPT

## 2023-03-07 PROCEDURE — 81001 URINALYSIS AUTO W/SCOPE: CPT

## 2023-03-07 PROCEDURE — 1159F MED LIST DOCD IN RCRD: CPT | Mod: CPTII,,, | Performed by: FAMILY MEDICINE

## 2023-03-07 PROCEDURE — 3077F SYST BP >= 140 MM HG: CPT | Mod: CPTII,,, | Performed by: FAMILY MEDICINE

## 2023-03-07 PROCEDURE — 1101F PT FALLS ASSESS-DOCD LE1/YR: CPT | Mod: CPTII,,, | Performed by: FAMILY MEDICINE

## 2023-03-07 PROCEDURE — 1160F PR REVIEW ALL MEDS BY PRESCRIBER/CLIN PHARMACIST DOCUMENTED: ICD-10-PCS | Mod: CPTII,,, | Performed by: FAMILY MEDICINE

## 2023-03-07 PROCEDURE — 3288F FALL RISK ASSESSMENT DOCD: CPT | Mod: CPTII,,, | Performed by: FAMILY MEDICINE

## 2023-03-07 PROCEDURE — 1101F PR PT FALLS ASSESS DOC 0-1 FALLS W/OUT INJ PAST YR: ICD-10-PCS | Mod: CPTII,,, | Performed by: FAMILY MEDICINE

## 2023-03-07 PROCEDURE — 82746 ASSAY OF FOLIC ACID SERUM: CPT

## 2023-03-07 PROCEDURE — 3288F PR FALLS RISK ASSESSMENT DOCUMENTED: ICD-10-PCS | Mod: CPTII,,, | Performed by: FAMILY MEDICINE

## 2023-03-07 PROCEDURE — 3077F PR MOST RECENT SYSTOLIC BLOOD PRESSURE >= 140 MM HG: ICD-10-PCS | Mod: CPTII,,, | Performed by: FAMILY MEDICINE

## 2023-03-07 PROCEDURE — 3078F PR MOST RECENT DIASTOLIC BLOOD PRESSURE < 80 MM HG: ICD-10-PCS | Mod: CPTII,,, | Performed by: FAMILY MEDICINE

## 2023-03-07 PROCEDURE — 1126F AMNT PAIN NOTED NONE PRSNT: CPT | Mod: CPTII,,, | Performed by: FAMILY MEDICINE

## 2023-03-07 PROCEDURE — 3078F DIAST BP <80 MM HG: CPT | Mod: CPTII,,, | Performed by: FAMILY MEDICINE

## 2023-03-07 RX ORDER — CARVEDILOL 3.12 MG/1
3.12 TABLET ORAL 2 TIMES DAILY WITH MEALS
Qty: 60 TABLET | Refills: 11 | Status: ON HOLD | OUTPATIENT
Start: 2023-03-07 | End: 2023-04-26 | Stop reason: HOSPADM

## 2023-03-07 NOTE — PROGRESS NOTES
Patient ID: [id number]     Chief Complaint: Follow-up and Hypertension        HPI:     Gwendolyn Trejo is a 82 y.o. female here today for Follow-up and Hypertension. Blood pressure remains elevated at home. Recently treated for UTI. Has cardiology appointment on 3/15/23.         ----------------------------  Anxiety disorder, unspecified  Aortic stenosis  Atrial fibrillation  CAD (coronary artery disease)  Coronary arteriosclerosis  Crushing injury of right foot  Diastolic dysfunction  History of Coumadin therapy  HTN (hypertension)  Inflamed seborrheic keratosis  Insomnia  Lumbar radiculopathy  Mitral regurgitation  Neovascular age-related macular degeneration  Pseudophakia of both eyes  Sciatica  Transient cerebral ischemia     Past Surgical History:   Procedure Laterality Date    ANKLE GANGLION CYST EXCISION Left     CARDIAC CATHETERIZATION  2013    Dr Yadav    CATARACT EXTRACTION Bilateral      SECTION       SECTION         Review of patient's allergies indicates:   Allergen Reactions    Penicillins Hives       Outpatient Medications Marked as Taking for the 3/7/23 encounter (Office Visit) with Estuardo Soriano, DO   Medication Sig Dispense Refill    clonazePAM (KLONOPIN) 0.5 MG tablet Take 1 tablet (0.5 mg total) by mouth every evening. 30 tablet 5    dexbrompheniramine-phenyleph (ALA-HIST PE) 2-10 mg Tab Take 1 tablet by mouth 2 (two) times daily as needed.      furosemide (LASIX) 40 MG tablet Take 1 tablet (40 mg total) by mouth every morning. 90 tablet 1    losartan (COZAAR) 100 MG tablet Take 1 tablet (100 mg total) by mouth once daily. 90 tablet 1    meloxicam (MOBIC) 15 MG tablet Take 1 tablet (15 mg total) by mouth daily as needed for Pain. 90 tablet 1    verapamiL (CALAN-SR) 240 MG CR tablet Take 2 tablets (480 mg total) by mouth once daily. 180 tablet 1    XARELTO 20 mg Tab Take 1 tablet (20 mg total) by mouth once daily. 90 tablet 1       Social History     Socioeconomic  "History    Marital status:    Tobacco Use    Smoking status: Never    Smokeless tobacco: Never   Substance and Sexual Activity    Alcohol use: Never    Drug use: Never    Sexual activity: Not Currently        Family History   Problem Relation Age of Onset    Heart failure Mother     Parkinsonism Father     Valvular heart disease Sister         infant - cause of death        Patient Care Team:  Estuardo Soriano DO as PCP - General (Family Medicine)  Salbador Scott Jr., MD as Consulting Physician (Ophthalmology)  Daniele Marie MD as Consulting Physician (Cardiology)  Jose Yadav MD as Consulting Physician (Vascular Surgery)     Subjective:     Review of Systems   Constitutional:  Negative for chills and fever.   Respiratory:  Negative for shortness of breath.    Cardiovascular:  Negative for chest pain.   Gastrointestinal:  Negative for constipation and diarrhea.   Neurological:  Negative for headaches.   Psychiatric/Behavioral:  The patient does not have insomnia.      See HPI for details  All Other ROS: Negative except as stated in HPI.       Objective:     BP (!) 164/74 (BP Location: Right arm, Patient Position: Sitting, BP Method: X-Large (Automatic))   Pulse 84   Temp 98.2 °F (36.8 °C)   Resp 20   Ht 5' 2" (1.575 m)   Wt 118.3 kg (260 lb 12.8 oz)   SpO2 95%   BMI 47.70 kg/m²     Physical Exam  Vitals reviewed.   Constitutional:       General: She is not in acute distress.     Appearance: Normal appearance.   Cardiovascular:      Rate and Rhythm: Normal rate and regular rhythm.      Heart sounds: No murmur heard.    No friction rub. No gallop.   Pulmonary:      Effort: No respiratory distress.      Breath sounds: No wheezing, rhonchi or rales.   Musculoskeletal:         General: No swelling, tenderness or deformity.      Right lower leg: No edema.      Left lower leg: No edema.   Skin:     General: Skin is warm and dry.      Findings: No lesion or rash.   Neurological:      General: " No focal deficit present.      Mental Status: She is alert.   Psychiatric:         Mood and Affect: Mood normal.       Assessment/Plan:     1. Essential hypertension  -     carvediloL (COREG) 3.125 MG tablet; Take 1 tablet (3.125 mg total) by mouth 2 (two) times daily with meals.  Dispense: 60 tablet; Refill: 11    2. Chronic atrial fibrillation  -     carvediloL (COREG) 3.125 MG tablet; Take 1 tablet (3.125 mg total) by mouth 2 (two) times daily with meals.  Dispense: 60 tablet; Refill: 11    3. At risk for falls    4. Dysuria  -     POCT URINE DIPSTICK WITHOUT MICROSCOPE  -     Urinalysis, Reflex to Urine Culture; Future; Expected date: 03/07/2023    5. Anemia of chronic disease  -     Iron and TIBC; Future; Expected date: 03/07/2023  -     Ferritin; Future; Expected date: 03/07/2023  -     Reticulocytes; Future; Expected date: 03/07/2023  -     Path Review, Peripheral Smear  -     CBC Auto Differential; Future; Expected date: 03/07/2023  -     Folate; Future; Expected date: 03/07/2023  -     Vitamin B12; Future; Expected date: 03/07/2023          Follow up:     Follow up in about 3 months (around 6/7/2023). In addition to their scheduled follow up, the patient has also been instructed to follow up on as needed basis.

## 2023-03-08 LAB
FERRITIN SERPL-MCNC: 21.54 NG/ML (ref 4.63–204)
FOLATE SERPL-MCNC: 7.9 NG/ML (ref 7–31.4)
IRON SATN MFR SERPL: 8 % (ref 20–50)
IRON SERPL-MCNC: 28 UG/DL (ref 50–170)
RET# (OHS): 0.06 (ref 0.02–0.08)
RETICULOCYTE COUNT AUTOMATED (OLG): 1.51 % (ref 1.1–2.1)
TIBC SERPL-MCNC: 333 UG/DL (ref 70–310)
TIBC SERPL-MCNC: 361 UG/DL (ref 250–450)
TRANSFERRIN SERPL-MCNC: 333 MG/DL
VIT B12 SERPL-MCNC: >2000 PG/ML (ref 213–816)

## 2023-03-09 ENCOUNTER — TELEPHONE (OUTPATIENT)
Dept: FAMILY MEDICINE | Facility: CLINIC | Age: 83
End: 2023-03-09
Payer: MEDICARE

## 2023-03-09 LAB — HEMATOLOGIST REVIEW: NORMAL

## 2023-03-09 RX ORDER — CIPROFLOXACIN 500 MG/1
500 TABLET ORAL 2 TIMES DAILY
Qty: 20 TABLET | Refills: 0 | Status: SHIPPED | OUTPATIENT
Start: 2023-03-09 | End: 2023-03-19

## 2023-03-09 NOTE — TELEPHONE ENCOUNTER
----- Message from Estuardo Soriano DO sent at 3/9/2023  8:09 AM CST -----  Please inform patient of lab results.    1. High B12, low iron     2.UTI, called in antibiotic    3. Other labwork within acceptable ranges.    Thanks,    Dr. Soriano

## 2023-03-10 LAB — BACTERIA UR CULT: ABNORMAL

## 2023-03-14 ENCOUNTER — TELEPHONE (OUTPATIENT)
Dept: FAMILY MEDICINE | Facility: CLINIC | Age: 83
End: 2023-03-14
Payer: MEDICARE

## 2023-03-14 DIAGNOSIS — R30.0 DYSURIA: Primary | ICD-10-CM

## 2023-03-14 NOTE — TELEPHONE ENCOUNTER
----- Message from [place] sent at 3/14/2023  9:13 AM CDT -----  Regarding: REACTION TO THE MEDS  Patients daughter called, Dee Bishophon, [phone], she feels like the cipro 500mg that he has her on to treat the UTI is to strong.  She said her mother is weak, doesn't feel right, and just really not herself..enough for the daughter to be worried.  She stated that her mother is eating before taking the meds.  If someone can please call her back about this concern.      Thanks, Niru

## 2023-03-17 ENCOUNTER — TELEPHONE (OUTPATIENT)
Dept: FAMILY MEDICINE | Facility: CLINIC | Age: 83
End: 2023-03-17
Payer: MEDICARE

## 2023-03-17 ENCOUNTER — LAB VISIT (OUTPATIENT)
Dept: LAB | Facility: HOSPITAL | Age: 83
End: 2023-03-17
Attending: FAMILY MEDICINE
Payer: MEDICARE

## 2023-03-17 DIAGNOSIS — R30.0 DYSURIA: ICD-10-CM

## 2023-03-17 LAB
APPEARANCE UR: CLEAR
BACTERIA #/AREA URNS AUTO: ABNORMAL /HPF
BILIRUB UR QL STRIP.AUTO: NEGATIVE MG/DL
COLOR UR AUTO: YELLOW
GLUCOSE UR QL STRIP.AUTO: NEGATIVE MG/DL
KETONES UR QL STRIP.AUTO: NEGATIVE MG/DL
LEUKOCYTE ESTERASE UR QL STRIP.AUTO: NEGATIVE UNIT/L
NITRITE UR QL STRIP.AUTO: NEGATIVE
PH UR STRIP.AUTO: 6 [PH]
PROT UR QL STRIP.AUTO: NEGATIVE MG/DL
RBC #/AREA URNS AUTO: ABNORMAL /HPF
RBC UR QL AUTO: NEGATIVE UNIT/L
SP GR UR STRIP.AUTO: 1.01
SQUAMOUS #/AREA URNS AUTO: ABNORMAL /HPF
UROBILINOGEN UR STRIP-ACNC: 0.2 MG/DL
WBC #/AREA URNS AUTO: ABNORMAL /HPF
WBC CLUMPS UR QL AUTO: ABNORMAL /HPF

## 2023-03-17 PROCEDURE — 81001 URINALYSIS AUTO W/SCOPE: CPT

## 2023-03-17 NOTE — TELEPHONE ENCOUNTER
Aleida blood pressure has been up and down since starting blood pressure medication. Her bp yesterday was 131/58 and when she laid back down it went up. Having intermittent lightheadedness

## 2023-04-08 DIAGNOSIS — I48.91 ATRIAL FIBRILLATION, UNSPECIFIED TYPE: ICD-10-CM

## 2023-04-10 RX ORDER — VERAPAMIL HYDROCHLORIDE 240 MG/1
TABLET, FILM COATED, EXTENDED RELEASE ORAL
Qty: 180 TABLET | Refills: 1 | Status: ON HOLD | OUTPATIENT
Start: 2023-04-10 | End: 2023-04-26 | Stop reason: HOSPADM

## 2023-04-24 ENCOUNTER — TELEPHONE (OUTPATIENT)
Dept: FAMILY MEDICINE | Facility: CLINIC | Age: 83
End: 2023-04-24
Payer: MEDICARE

## 2023-04-24 ENCOUNTER — HOSPITAL ENCOUNTER (EMERGENCY)
Facility: HOSPITAL | Age: 83
Discharge: ANOTHER HOSPITAL | End: 2023-04-25
Attending: EMERGENCY MEDICINE
Payer: MEDICARE

## 2023-04-24 VITALS
TEMPERATURE: 98 F | DIASTOLIC BLOOD PRESSURE: 95 MMHG | OXYGEN SATURATION: 94 % | WEIGHT: 256 LBS | RESPIRATION RATE: 37 BRPM | BODY MASS INDEX: 45.36 KG/M2 | HEIGHT: 63 IN | HEART RATE: 87 BPM | SYSTOLIC BLOOD PRESSURE: 151 MMHG

## 2023-04-24 DIAGNOSIS — R00.1 BRADYCARDIA: ICD-10-CM

## 2023-04-24 DIAGNOSIS — S00.03XA CONTUSION OF SCALP, INITIAL ENCOUNTER: ICD-10-CM

## 2023-04-24 DIAGNOSIS — R00.1 SYMPTOMATIC BRADYCARDIA: Primary | ICD-10-CM

## 2023-04-24 DIAGNOSIS — Z79.01 ANTICOAGULANT LONG-TERM USE: ICD-10-CM

## 2023-04-24 DIAGNOSIS — R55 SYNCOPE, UNSPECIFIED SYNCOPE TYPE: ICD-10-CM

## 2023-04-24 LAB
ALBUMIN SERPL-MCNC: 3.6 G/DL (ref 3.4–4.8)
ALBUMIN/GLOB SERPL: 1.1 RATIO (ref 1.1–2)
ALP SERPL-CCNC: 72 UNIT/L (ref 40–150)
ALT SERPL-CCNC: 9 UNIT/L (ref 0–55)
APTT PPP: 29.1 SECONDS (ref 21.4–28.3)
AST SERPL-CCNC: 14 UNIT/L (ref 5–34)
BASOPHILS # BLD AUTO: 0.06 X10(3)/MCL (ref 0–0.2)
BASOPHILS NFR BLD AUTO: 0.7 %
BILIRUBIN DIRECT+TOT PNL SERPL-MCNC: 0.9 MG/DL
BUN SERPL-MCNC: 32 MG/DL (ref 9.8–20.1)
CALCIUM SERPL-MCNC: 9.3 MG/DL (ref 8.4–10.2)
CHLORIDE SERPL-SCNC: 101 MMOL/L (ref 98–107)
CO2 SERPL-SCNC: 26 MMOL/L (ref 23–31)
CREAT SERPL-MCNC: 1.49 MG/DL (ref 0.55–1.02)
EOSINOPHIL # BLD AUTO: 0.22 X10(3)/MCL (ref 0–0.9)
EOSINOPHIL NFR BLD AUTO: 2.6 %
ERYTHROCYTE [DISTWIDTH] IN BLOOD BY AUTOMATED COUNT: 16.8 % (ref 11.5–17)
GFR SERPLBLD CREATININE-BSD FMLA CKD-EPI: 34 MLS/MIN/1.73/M2
GLOBULIN SER-MCNC: 3.3 GM/DL (ref 2.4–3.5)
GLUCOSE SERPL-MCNC: 190 MG/DL (ref 82–115)
HCT VFR BLD AUTO: 34.7 % (ref 37–47)
HGB BLD-MCNC: 10.6 G/DL (ref 12–16)
IMM GRANULOCYTES # BLD AUTO: 0.03 X10(3)/MCL (ref 0–0.04)
IMM GRANULOCYTES NFR BLD AUTO: 0.4 %
INR BLD: 1.3 (ref 0–1.3)
LYMPHOCYTES # BLD AUTO: 1.02 X10(3)/MCL (ref 0.6–4.6)
LYMPHOCYTES NFR BLD AUTO: 12.2 %
MAGNESIUM SERPL-MCNC: 1.9 MG/DL (ref 1.6–2.6)
MCH RBC QN AUTO: 25.4 PG (ref 27–31)
MCHC RBC AUTO-ENTMCNC: 30.5 G/DL (ref 33–36)
MCV RBC AUTO: 83 FL (ref 80–94)
MONOCYTES # BLD AUTO: 0.82 X10(3)/MCL (ref 0.1–1.3)
MONOCYTES NFR BLD AUTO: 9.8 %
NEUTROPHILS # BLD AUTO: 6.22 X10(3)/MCL (ref 2.1–9.2)
NEUTROPHILS NFR BLD AUTO: 74.3 %
NRBC BLD AUTO-RTO: 0 %
PLATELET # BLD AUTO: 173 X10(3)/MCL (ref 130–400)
PMV BLD AUTO: 11.4 FL (ref 7.4–10.4)
POTASSIUM SERPL-SCNC: 4.4 MMOL/L (ref 3.5–5.1)
PROT SERPL-MCNC: 6.9 GM/DL (ref 5.8–7.6)
PROTHROMBIN TIME: 12.7 SECONDS (ref 9.1–10.9)
RBC # BLD AUTO: 4.18 X10(6)/MCL (ref 4.2–5.4)
SODIUM SERPL-SCNC: 139 MMOL/L (ref 136–145)
TROPONIN I SERPL-MCNC: 0.1 NG/ML (ref 0–0.04)
TROPONIN I SERPL-MCNC: 0.1 NG/ML (ref 0–0.04)
TSH SERPL-ACNC: 2.77 UIU/ML (ref 0.35–4.94)
WBC # SPEC AUTO: 8.4 X10(3)/MCL (ref 4.5–11.5)

## 2023-04-24 PROCEDURE — 99900031 HC PATIENT EDUCATION (STAT)

## 2023-04-24 PROCEDURE — 96375 TX/PRO/DX INJ NEW DRUG ADDON: CPT

## 2023-04-24 PROCEDURE — 80053 COMPREHEN METABOLIC PANEL: CPT | Performed by: EMERGENCY MEDICINE

## 2023-04-24 PROCEDURE — 84443 ASSAY THYROID STIM HORMONE: CPT | Performed by: EMERGENCY MEDICINE

## 2023-04-24 PROCEDURE — 96374 THER/PROPH/DIAG INJ IV PUSH: CPT

## 2023-04-24 PROCEDURE — 84484 ASSAY OF TROPONIN QUANT: CPT | Performed by: EMERGENCY MEDICINE

## 2023-04-24 PROCEDURE — 63600175 PHARM REV CODE 636 W HCPCS: Performed by: EMERGENCY MEDICINE

## 2023-04-24 PROCEDURE — 83735 ASSAY OF MAGNESIUM: CPT | Performed by: EMERGENCY MEDICINE

## 2023-04-24 PROCEDURE — 85730 THROMBOPLASTIN TIME PARTIAL: CPT | Performed by: EMERGENCY MEDICINE

## 2023-04-24 PROCEDURE — 85025 COMPLETE CBC W/AUTO DIFF WBC: CPT | Performed by: EMERGENCY MEDICINE

## 2023-04-24 PROCEDURE — 99291 CRITICAL CARE FIRST HOUR: CPT | Mod: 25

## 2023-04-24 PROCEDURE — 93005 ELECTROCARDIOGRAM TRACING: CPT

## 2023-04-24 PROCEDURE — 25000003 PHARM REV CODE 250: Performed by: EMERGENCY MEDICINE

## 2023-04-24 PROCEDURE — 85610 PROTHROMBIN TIME: CPT | Performed by: EMERGENCY MEDICINE

## 2023-04-24 RX ORDER — HYDRALAZINE HYDROCHLORIDE 20 MG/ML
10 INJECTION INTRAMUSCULAR; INTRAVENOUS
Status: COMPLETED | OUTPATIENT
Start: 2023-04-24 | End: 2023-04-24

## 2023-04-24 RX ORDER — ATROPINE SULFATE 0.1 MG/ML
0.5 INJECTION INTRAVENOUS
Status: COMPLETED | OUTPATIENT
Start: 2023-04-24 | End: 2023-04-24

## 2023-04-24 RX ORDER — UREA 10 %
1 LOTION (ML) TOPICAL DAILY
COMMUNITY
Start: 2023-03-15 | End: 2024-01-31

## 2023-04-24 RX ORDER — ACETAMINOPHEN 500 MG
1000 TABLET ORAL
Status: COMPLETED | OUTPATIENT
Start: 2023-04-24 | End: 2023-04-24

## 2023-04-24 RX ORDER — APIXABAN 5 MG/1
5 TABLET, FILM COATED ORAL 2 TIMES DAILY
COMMUNITY
Start: 2023-04-21

## 2023-04-24 RX ADMIN — HYDRALAZINE HYDROCHLORIDE 10 MG: 20 INJECTION INTRAMUSCULAR; INTRAVENOUS at 06:04

## 2023-04-24 RX ADMIN — ACETAMINOPHEN 1000 MG: 500 TABLET ORAL at 09:04

## 2023-04-24 RX ADMIN — ATROPINE SULFATE 0.5 MG: 0.1 INJECTION INTRAVENOUS at 02:04

## 2023-04-24 NOTE — TELEPHONE ENCOUNTER
Recommend holding the Coreg until the matter can be discussed with Cardiologist. If blood pressure gets above 130 systolic, have her try taking just one tablet a day or half a tablet a day until her Cardiology appointment.

## 2023-04-24 NOTE — ED PROVIDER NOTES
Encounter Date: 4/24/2023       History     Chief Complaint   Patient presents with    Fall     Fell at home family wanted to have her head checked out, [facility] found that her heart rate was 35-42 patient is awake and alert denies any weakness or dizziness.       Patient is an 82-year-old female transferred by ambulance to the ER secondary to apparent syncopal episode and fall at home.  Just prior to arrival,  patient's daughter states she heard patient fall in another room and when she checked on her she was lying on the floor.  Patient states she does not remember the fall.  Patient sustained a contusion to the right occipital area.  Patient is on blood thinners, Eliquis.  Patient is status post TAVR procedure  4 days ago.  Patient denies headache.  Patient denies neck pain.  Patient denies chest pain or shortness of breath.  No abdominal pain, nausea, or vomiting.  Patient denies any focal weakness.  On arrival, patient is bradycardic.  Patient states for the last couple of days she has felt lightheaded when she stands.  Patient's daughter states patient had a low blood pressure yesterday with a systolic of 96.  Patient denies any dysuria.  She denies fever or chills.  Patient is on verapamil 240 mg once a day and losartan.      Dr. Horn is patient's cardiologist.   TAVR on 4/20/23.    Review of patient's allergies indicates:   Allergen Reactions    Penicillins Hives     Past Medical History:   Diagnosis Date    Anemia of chronic disease     Anxiety disorder     Aortic stenosis     Atrial fibrillation     CAD (coronary artery disease)     Coronary arteriosclerosis     Crushing injury of right foot     Diastolic dysfunction     Diverticulosis     History of Coumadin therapy     History of transient ischemic attack (TIA)     HTN (hypertension)     JOAQUIN (iron deficiency anemia)     Inflamed seborrheic keratosis     Insomnia     Lumbar radiculopathy     Mitral regurgitation     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     Nodular calcific aortic valve stenosis     Pseudophakia of both eyes     Renal scarring     S/P TAVR (transcatheter aortic valve replacement) 2023    Sciatica     Severe aortic stenosis     Transient cerebral ischemia      Past Surgical History:   Procedure Laterality Date    ANKLE GANGLION CYST EXCISION Left     CARDIAC CATHETERIZATION  2013    Dr Yadav    CATARACT EXTRACTION Bilateral      SECTION       SECTION      TRANSCATHETER AORTIC VALVE REPLACEMENT (TAVR)  2023    Dr Mauricio Horn     Family History   Problem Relation Age of Onset    Heart failure Mother     Parkinsonism Father     Valvular heart disease Sister         infant - cause of death     Social History     Tobacco Use    Smoking status: Never    Smokeless tobacco: Never   Substance Use Topics    Alcohol use: Never    Drug use: Never     Review of Systems   Constitutional:  Positive for activity change and fatigue.   HENT: Negative.     Eyes: Negative.    Respiratory: Negative.     Cardiovascular: Negative.  Negative for chest pain and palpitations.   Gastrointestinal: Negative.    Genitourinary: Negative.    Musculoskeletal: Negative.    Neurological:  Positive for light-headedness. Negative for dizziness, tremors, seizures, syncope, speech difficulty, weakness and headaches.   Psychiatric/Behavioral: Negative.       Physical Exam     Initial Vitals   BP Pulse Resp Temp SpO2   23 1300 23 1300 23 1300 23 1300 23 1450   (!) 141/53 (!) 33 (!) 22 98.1 °F (36.7 °C) 95 %      MAP       --                Physical Exam    Nursing note and vitals reviewed.  Constitutional: She appears well-developed.   Patient is alert and oriented, she is in no apparent distress.  She answers questions appropriately.   HENT:   Patient has a moderate-size contusion to the right occipital area.  No laceration.   Eyes: Pupils are equal, round, and  reactive to light.   Neck: Neck supple.   There is no C-spine tenderness to palpation.  No deformity or step-off.   Normal range of motion.  Cardiovascular:  Normal heart sounds and intact distal pulses.           Positive for bradycardia, heart rate is in the lower 30s.   Pulmonary/Chest: Breath sounds normal. No respiratory distress.   Abdominal: Abdomen is soft. There is no abdominal tenderness.   Musculoskeletal:         General: Normal range of motion.      Cervical back: Normal range of motion and neck supple.      Comments: Patient has mild bruising bilateral groin area secondary to recent TAVR procedure     Neurological: She is alert and oriented to person, place, and time. She has normal strength.   Skin: Skin is warm.   Psychiatric: She has a normal mood and affect. Her behavior is normal. Thought content normal.       ED Course   Critical Care    Date/Time: 4/24/2023 4:39 PM  Performed by: Demarcus Saldana MD  Authorized by: Demarcus Saldana MD   Direct patient critical care time: 20 minutes  Additional history critical care time: 5 minutes  Ordering / reviewing critical care time: 10 minutes  Documentation critical care time: 15 minutes  Total critical care time (exclusive of procedural time) : 50 minutes  Critical care time was exclusive of separately billable procedures and treating other patients and teaching time.  Critical care was necessary to treat or prevent imminent or life-threatening deterioration of the following conditions: cardiac failure and circulatory failure.  Critical care was time spent personally by me on the following activities: development of treatment plan with patient or surrogate, interpretation of cardiac output measurements, evaluation of patient's response to treatment, examination of patient, obtaining history from patient or surrogate, ordering and performing treatments and interventions, ordering and review of laboratory studies, ordering and review of radiographic  studies, pulse oximetry, re-evaluation of patient's condition and review of old charts.      Labs Reviewed   APTT - Abnormal; Notable for the following components:       Result Value    PTT 29.1 (*)     All other components within normal limits   COMPREHENSIVE METABOLIC PANEL - Abnormal; Notable for the following components:    Glucose Level 190 (*)     Blood Urea Nitrogen 32.0 (*)     Creatinine 1.49 (*)     All other components within normal limits   TROPONIN I - Abnormal; Notable for the following components:    Troponin-I 0.100 (*)     All other components within normal limits   PROTIME-INR - Abnormal; Notable for the following components:    PT 12.7 (*)     All other components within normal limits   CBC WITH DIFFERENTIAL - Abnormal; Notable for the following components:    RBC 4.18 (*)     Hgb 10.6 (*)     Hct 34.7 (*)     MCH 25.4 (*)     MCHC 30.5 (*)     MPV 11.4 (*)     All other components within normal limits   TSH - Normal   MAGNESIUM - Normal   CBC W/ AUTO DIFFERENTIAL    Narrative:     The following orders were created for panel order CBC auto differential.  Procedure                               Abnormality         Status                     ---------                               -----------         ------                     CBC with Differential[[id number]]        Abnormal            Final result                 Please view results for these tests on the individual orders.     EKG Readings: (Independently Interpreted)   Initial Reading: No STEMI. Rhythm: Idioventricular Rhythm. Heart Rate: 35. Ectopy: PVCs. ST Segments: Normal ST Segments. T Waves: Normal. Axis: Normal.   Repeat EKG after atropine given:HR 61, junctional appearing rhythm   ECG Results              EKG 12-lead (In process)  Result time 04/24/23 16:04:44      In process by Interface, Lab In [facility] (04/24/23 16:04:44)                   Narrative:    Test Reason : R00.1,    Vent. Rate : 061 BPM     Atrial Rate : 067 BPM     P-R Int :  000 ms          QRS Dur : 094 ms      QT Int : 396 ms       P-R-T Axes : 000 005 139 degrees     QTc Int : 398 ms    Undetermined rhythm  Cannot rule out Inferior infarct ,age undetermined  Cannot rule out Anterior infarct ,age undetermined  ST and T wave abnormality, consider lateral ischemia  Abnormal ECG  When compared with ECG of 24-APR-2023 12:58,  Current undetermined rhythm precludes rhythm comparison, needs review    Referred By: [email or web address]   SELF           Confirmed By:                                      EKG 12-lead (In process)  Result time 04/24/23 13:59:14      In process by Interface, Lab In [facility] (04/24/23 13:59:14)                   Narrative:    Test Reason : R00.1,    Vent. Rate : 035 BPM     Atrial Rate : 047 BPM     P-R Int : 000 ms          QRS Dur : 158 ms      QT Int : 584 ms       P-R-T Axes : 000 247 098 degrees     QTc Int : 445 ms     Suspect arm lead reversal, interpretation assumes no reversal  Idioventricular rhythm with Premature ventricular complexes or Fusion  complexes  Nonspecific intraventricular block  Lateral infarct ,age undetermined  Abnormal ECG  No previous ECGs available    Referred By: [email or web address]   SELF           Confirmed By:                                   Imaging Results              CT Head Without Contrast (Final result)  Result time 04/24/23 14:31:23      Final result by Jose Billings MD (04/24/23 14:31:23)                   Impression:      1. No acute intracranial findings.  2. Areas of mild soft tissue emphysema without fracture seen.      Electronically signed by: Jose Billings  Date:    04/24/2023  Time:    14:31               Narrative:    EXAMINATION:  CT HEAD WITHOUT CONTRAST    CLINICAL HISTORY:  Head trauma, intracranial arterial injury suspected;fall, On blood thinners;    TECHNIQUE:  CT imaging of the head performed from the skull base to the vertex without intravenous contrast.  mGycm. Automatic exposure control, adjustment of mA/kV or  iterative reconstruction technique was used to reduce radiation.    COMPARISON:  22 December 2020    FINDINGS:  There is no acute cortical infarct, hemorrhage or mass lesion.  There is similar patchy hypoattenuation in the cerebral white matter.  There is moderate generalized atrophy.  The ventricles are not enlarged    Paranasal sinuses and mastoid air cells are clear.  Some mild soft tissue emphysema is seen around the right orbit and also along the cavernous portion of the right internal carotid artery.  No fracture is seen.  There is some soft tissue swelling over the left parietal scalp.                                       Medications   atropine injection 0.5 mg (0.5 mg Intravenous Given 4/24/23 1433)     Medical Decision Making:   Initial Assessment:     As per HPI  Differential Diagnosis:    Syncope, fall, bradycardia, arrhythmia  Clinical Tests:   Lab Tests: Ordered and Reviewed       <> Summary of Lab: All labs are stable, trop 0.1, blood sugar is elevated at 190, mild anemia is present  Radiological Study: Reviewed and Ordered           ED Course as of 04/24/23 1845   Mon Apr 24, 2023   1412   Heart rate now going into the upper 20s, atropine was ordered.  Patient remains alert and oriented. [KG]   1605 Atropine was given earlier, heart rate in the lower 60's [KG]   1627 Awaiting accepting hospital for this patient.  Heart rate remains in the low 60's. [KG]   1839 Blood pressure is elevated, hydralazine ordered [KG]   1839 Still awaiting accepting facility.  Dr Newby to take over pt care. [KG]      ED Course User Index  [KG] Demarcus Saldana MD                 Clinical Impression:   Final diagnoses:  [R00.1] Bradycardia  [R00.1] Symptomatic bradycardia (Primary)  [R55] Syncope, unspecified syncope type  [S00.03XA] Contusion of scalp, initial encounter  [Z79.01] Anticoagulant long-term use        ED Disposition Condition    Transfer to Another Facility Stable                Demarcus Saldana,  MD  04/26/23 4170

## 2023-04-24 NOTE — TELEPHONE ENCOUNTER
----- Message from [place] sent at 4/24/2023  8:08 AM CDT -----  Regarding: complication with med  Patients daughter Dee had called this morning concerning her mothers medication.  Her Cell [phone]  Patient had a leaky value replacement this past Thursday with Dr. Horn.  She takes verapamil and losartan, and Dr. GORDILLO had put her on Coreg 2xday.  When she takes the coreg (x2 tabs in am) with all the other medications its causing her dizziness, weakness and drops her blood pressure (95/63) .  Patient has a follow up with Dr. Horn on Wednesday, but she wanted to address this concern with Dr. GORDILLO, as of this morning the patient has not taken the medication.     Please reach out to Dee, Thanks Niru

## 2023-04-24 NOTE — ED NOTES
Pt brought in via stretcher via EMS. Pt states she fell. Pt states she had a heart valve replacement last week. Pt HR is 31 on monitor. Pt denies any symptoms. Pt family in waiting room.

## 2023-04-25 ENCOUNTER — HOSPITAL ENCOUNTER (INPATIENT)
Facility: HOSPITAL | Age: 83
LOS: 1 days | Discharge: HOME | DRG: 309 | End: 2023-04-26
Attending: INTERNAL MEDICINE | Admitting: INTERNAL MEDICINE
Payer: MEDICARE

## 2023-04-25 DIAGNOSIS — I25.10 CORONARY ARTERIOSCLEROSIS: ICD-10-CM

## 2023-04-25 DIAGNOSIS — R55 SYNCOPE AND COLLAPSE: ICD-10-CM

## 2023-04-25 DIAGNOSIS — I48.20 CHRONIC ATRIAL FIBRILLATION: ICD-10-CM

## 2023-04-25 DIAGNOSIS — I10 ESSENTIAL HYPERTENSION: Primary | ICD-10-CM

## 2023-04-25 DIAGNOSIS — R00.1 BRADYCARDIA: ICD-10-CM

## 2023-04-25 PROCEDURE — 25000003 PHARM REV CODE 250: Performed by: STUDENT IN AN ORGANIZED HEALTH CARE EDUCATION/TRAINING PROGRAM

## 2023-04-25 PROCEDURE — 63600175 PHARM REV CODE 636 W HCPCS

## 2023-04-25 PROCEDURE — 21400001 HC TELEMETRY ROOM

## 2023-04-25 RX ORDER — HYDRALAZINE HYDROCHLORIDE 50 MG/1
50 TABLET, FILM COATED ORAL EVERY 8 HOURS
Status: DISCONTINUED | OUTPATIENT
Start: 2023-04-25 | End: 2023-04-26 | Stop reason: HOSPADM

## 2023-04-25 RX ORDER — FUROSEMIDE 40 MG/1
40 TABLET ORAL EVERY MORNING
Status: DISCONTINUED | OUTPATIENT
Start: 2023-04-25 | End: 2023-04-26 | Stop reason: HOSPADM

## 2023-04-25 RX ORDER — LOSARTAN POTASSIUM 50 MG/1
100 TABLET ORAL DAILY
Status: DISCONTINUED | OUTPATIENT
Start: 2023-04-25 | End: 2023-04-26 | Stop reason: HOSPADM

## 2023-04-25 RX ORDER — HYDRALAZINE HYDROCHLORIDE 20 MG/ML
10 INJECTION INTRAMUSCULAR; INTRAVENOUS EVERY 8 HOURS PRN
Status: DISCONTINUED | OUTPATIENT
Start: 2023-04-25 | End: 2023-04-26 | Stop reason: HOSPADM

## 2023-04-25 RX ORDER — HYDRALAZINE HYDROCHLORIDE 20 MG/ML
10 INJECTION INTRAMUSCULAR; INTRAVENOUS EVERY 8 HOURS PRN
Status: DISCONTINUED | OUTPATIENT
Start: 2023-04-25 | End: 2023-04-25

## 2023-04-25 RX ADMIN — LOSARTAN POTASSIUM 100 MG: 50 TABLET, FILM COATED ORAL at 08:04

## 2023-04-25 RX ADMIN — HYDRALAZINE HYDROCHLORIDE 50 MG: 50 TABLET, FILM COATED ORAL at 10:04

## 2023-04-25 RX ADMIN — FUROSEMIDE 40 MG: 40 TABLET ORAL at 08:04

## 2023-04-25 RX ADMIN — HYDRALAZINE HYDROCHLORIDE 10 MG: 20 INJECTION INTRAMUSCULAR; INTRAVENOUS at 05:04

## 2023-04-25 NOTE — PT/OT/SLP PROGRESS
Physical Therapy      Patient Name:  Gwendolyn Trejo   MRN:  [id number]    Patient not seen today for PT. Attempted to see pt twice today, in AM and PM; however, there were no notes documented from medical team. Will follow-up as appropriate.

## 2023-04-25 NOTE — NURSING
Nurses Note -- 4 Eyes      4/25/2023   1:47 AM      Skin assessed during: Admit      [x] No Altered Skin Integrity Present    [x]Prevention Measures Documented      [] Yes- Altered Skin Integrity Present or Discovered   [] LDA Added if Not in Epic (Describe Wound)   [] New Altered Skin Integrity was Present on Admit and Documented in LDA   [] Wound Image Taken    Wound Care Consulted? No    Attending Nurse:  Ramona Junior RN     Second RN/Staff Member:  Shital Early RN

## 2023-04-25 NOTE — ED NOTES
Spoke with Renee at transfer center. Pt accepted to [facility] by Dr. Brantley, Room 978.Number for report: [phone].

## 2023-04-25 NOTE — PLAN OF CARE
Problem: Adult Inpatient Plan of Care  Goal: Plan of Care Review  Outcome: Ongoing, Progressing  Flowsheets (Taken 4/25/2023 0936)  Plan of Care Reviewed With:   patient   family  Goal: Absence of Hospital-Acquired Illness or Injury  Outcome: Ongoing, Progressing  Intervention: Identify and Manage Fall Risk  Flowsheets (Taken 4/25/2023 0936)  Safety Promotion/Fall Prevention:   assistive device/personal item within reach   bed alarm set   Fall Risk reviewed with patient/family   Fall Risk signage in place   nonskid shoes/socks when out of bed   side rails raised x 2   instructed to call staff for mobility  Goal: Optimal Comfort and Wellbeing  Outcome: Ongoing, Progressing  Intervention: Monitor Pain and Promote Comfort  Flowsheets (Taken 4/25/2023 0936)  Pain Management Interventions:   care clustered   quiet environment facilitated   position adjusted

## 2023-04-26 VITALS
HEIGHT: 63 IN | BODY MASS INDEX: 45.35 KG/M2 | HEART RATE: 98 BPM | SYSTOLIC BLOOD PRESSURE: 136 MMHG | OXYGEN SATURATION: 98 % | RESPIRATION RATE: 20 BRPM | TEMPERATURE: 98 F | DIASTOLIC BLOOD PRESSURE: 78 MMHG

## 2023-04-26 PROBLEM — R00.1 BRADYCARDIA: Status: ACTIVE | Noted: 2023-04-26

## 2023-04-26 LAB
ALBUMIN SERPL-MCNC: 3.6 G/DL (ref 3.4–4.8)
ALBUMIN/GLOB SERPL: 1.3 RATIO (ref 1.1–2)
ALP SERPL-CCNC: 68 UNIT/L (ref 40–150)
ALT SERPL-CCNC: 10 UNIT/L (ref 0–55)
AST SERPL-CCNC: 15 UNIT/L (ref 5–34)
BASOPHILS # BLD AUTO: 0.08 X10(3)/MCL (ref 0–0.2)
BASOPHILS NFR BLD AUTO: 1 %
BILIRUBIN DIRECT+TOT PNL SERPL-MCNC: 1.3 MG/DL
BUN SERPL-MCNC: 17.3 MG/DL (ref 9.8–20.1)
CALCIUM SERPL-MCNC: 9 MG/DL (ref 8.4–10.2)
CHLORIDE SERPL-SCNC: 103 MMOL/L (ref 98–107)
CO2 SERPL-SCNC: 26 MMOL/L (ref 23–31)
CREAT SERPL-MCNC: 0.87 MG/DL (ref 0.55–1.02)
EOSINOPHIL # BLD AUTO: 0.26 X10(3)/MCL (ref 0–0.9)
EOSINOPHIL NFR BLD AUTO: 3.3 %
ERYTHROCYTE [DISTWIDTH] IN BLOOD BY AUTOMATED COUNT: 17.2 % (ref 11.5–17)
GFR SERPLBLD CREATININE-BSD FMLA CKD-EPI: >60 MLS/MIN/1.73/M2
GLOBULIN SER-MCNC: 2.7 GM/DL (ref 2.4–3.5)
GLUCOSE SERPL-MCNC: 99 MG/DL (ref 82–115)
HCT VFR BLD AUTO: 37.4 % (ref 37–47)
HGB BLD-MCNC: 11.5 G/DL (ref 12–16)
IMM GRANULOCYTES # BLD AUTO: 0.03 X10(3)/MCL (ref 0–0.04)
IMM GRANULOCYTES NFR BLD AUTO: 0.4 %
LYMPHOCYTES # BLD AUTO: 0.98 X10(3)/MCL (ref 0.6–4.6)
LYMPHOCYTES NFR BLD AUTO: 12.5 %
MCH RBC QN AUTO: 25.7 PG (ref 27–31)
MCHC RBC AUTO-ENTMCNC: 30.7 G/DL (ref 33–36)
MCV RBC AUTO: 83.5 FL (ref 80–94)
MONOCYTES # BLD AUTO: 0.94 X10(3)/MCL (ref 0.1–1.3)
MONOCYTES NFR BLD AUTO: 12 %
NEUTROPHILS # BLD AUTO: 5.56 X10(3)/MCL (ref 2.1–9.2)
NEUTROPHILS NFR BLD AUTO: 70.8 %
NRBC BLD AUTO-RTO: 0 %
PLATELET # BLD AUTO: 187 X10(3)/MCL (ref 130–400)
PMV BLD AUTO: 11.6 FL (ref 7.4–10.4)
POTASSIUM SERPL-SCNC: 3.8 MMOL/L (ref 3.5–5.1)
PROT SERPL-MCNC: 6.3 GM/DL (ref 5.8–7.6)
RBC # BLD AUTO: 4.48 X10(6)/MCL (ref 4.2–5.4)
SODIUM SERPL-SCNC: 138 MMOL/L (ref 136–145)
WBC # SPEC AUTO: 7.9 X10(3)/MCL (ref 4.5–11.5)

## 2023-04-26 PROCEDURE — 25000003 PHARM REV CODE 250: Performed by: STUDENT IN AN ORGANIZED HEALTH CARE EDUCATION/TRAINING PROGRAM

## 2023-04-26 PROCEDURE — 80053 COMPREHEN METABOLIC PANEL: CPT | Performed by: INTERNAL MEDICINE

## 2023-04-26 PROCEDURE — 97161 PT EVAL LOW COMPLEX 20 MIN: CPT

## 2023-04-26 PROCEDURE — 85025 COMPLETE CBC W/AUTO DIFF WBC: CPT | Performed by: INTERNAL MEDICINE

## 2023-04-26 RX ORDER — HYDRALAZINE HYDROCHLORIDE 50 MG/1
100 TABLET, FILM COATED ORAL EVERY 12 HOURS
Qty: 120 TABLET | Refills: 11 | Status: SHIPPED | OUTPATIENT
Start: 2023-04-26 | End: 2024-04-25

## 2023-04-26 RX ADMIN — HYDRALAZINE HYDROCHLORIDE 50 MG: 50 TABLET, FILM COATED ORAL at 06:04

## 2023-04-26 RX ADMIN — FUROSEMIDE 40 MG: 40 TABLET ORAL at 06:04

## 2023-04-26 RX ADMIN — LOSARTAN POTASSIUM 100 MG: 50 TABLET, FILM COATED ORAL at 08:04

## 2023-04-26 NOTE — PT/OT/SLP EVAL
"Physical Therapy Evaluation and Discharge Note    Patient Name:  Gwendolyn Trejo   MRN:  [id number]    Recommendations:     Discharge Recommendations: home (with family assistance)  Discharge Equipment Recommendations: none   Barriers to discharge:  medical dx    Assessment:     Gwendolyn Trejo is a 82 y.o. female admitted with a medical diagnosis of Bradycardia; fall.   At this time, patient is functioning at their prior level of function and does not require further acute PT services.     Recent Surgery: * No surgery found *      Plan:     During this hospitalization, patient does not require further acute PT services.  Please re-consult if situation changes.      Subjective     Chief Complaint: n/a  Patient/Family Comments/goals: to go home   Pain/Comfort:  Pain Rating 1: 0/10    Patients cultural, spiritual, Anglican conflicts given the current situation: no    Living Environment:  Pt lives with daughter in a [facility], threshold to enter/exit  Prior to admission, patients level of function was independent.    Equipment owned at home: walker, rolling, rollator, cane, straight ("shower stool"); only used RW since procedure about a week ago.    Upon discharge, patient will have assistance from family; also, daughter reported that they are looking in to getting caregivers upon d/c since daughter works during the day.    Objective:     Communicated with NSG prior to session.  Patient found up in chair with pulse ox (continuous), telemetry upon PT entry to room.    General Precautions: Standard, fall    Orthopedic Precautions:N/A   Braces: N/A  Respiratory Status: Room air  Heart Rate: 93 at rest, 140s following ambulation (MD present and aware)    Exams:  Cognitive Exam:  Patient is oriented to Person, Place, Time, and Situation  RLE Strength: WFL  LLE Strength: WFL    Functional Mobility:  Transfers:     Sit to Stand:  modified independence with rolling walker  Gait: pt ambulated approx 110 ft with use of RW; slow but " steady step through pattern; decreased foot clearance of B LE; no overt LOB or safety concerns       Patient and daughter/s provided with verbal education regarding POC, mobility, and safety.  Understanding was verbalized.     Patient left up in chair with all lines intact, call button in reach, RN notified, and daughter and MD present.    GOALS:   Multidisciplinary Problems       Physical Therapy Goals       Not on file                    History:     Past Medical History:   Diagnosis Date    Anemia of chronic disease     Anxiety disorder     Aortic stenosis     Atrial fibrillation     CAD (coronary artery disease)     Coronary arteriosclerosis     Crushing injury of right foot     Diastolic dysfunction     Diverticulosis     History of Coumadin therapy     History of transient ischemic attack (TIA)     HTN (hypertension)     JOAQUIN (iron deficiency anemia)     Inflamed seborrheic keratosis     Insomnia     Lumbar radiculopathy     Mitral regurgitation     Neovascular age-related macular degeneration     Nodular calcific aortic valve stenosis     Pseudophakia of both eyes     Renal scarring     S/P TAVR (transcatheter aortic valve replacement) 2023    Sciatica     Severe aortic stenosis     Transient cerebral ischemia        Past Surgical History:   Procedure Laterality Date    ANKLE GANGLION CYST EXCISION Left     CARDIAC CATHETERIZATION  2013    Dr Yadav    CATARACT EXTRACTION Bilateral      SECTION       SECTION      TRANSCATHETER AORTIC VALVE REPLACEMENT (TAVR)  2023    Dr Mauricio Horn       Time Tracking:     PT Received On: 23  PT Start Time: 1034     PT Stop Time: 1043  PT Total Time (min): 9 min     Billable Minutes: Evaluation low      2023

## 2023-04-26 NOTE — NURSING
Went over d/c instructions regarding help at home, new prescriptions, and follow up appts. Verbalized understanding. No questions or concerns. IV removed and tele d/c. Stable at d/c.

## 2023-04-26 NOTE — PLAN OF CARE
04/26/23 1025   Discharge Assessment   Assessment Type Discharge Planning Assessment   Confirmed/corrected address, phone number and insurance Yes   Confirmed Demographics Correct on Facesheet   Source of Information patient;family   Does patient/caregiver understand observation status Yes   Communicated VILMA with patient/caregiver Yes;Date not available/Unable to determine   People in Home child(renzo), adult   Do you expect to return to your current living situation? Yes   Prior to hospitilization cognitive status: Alert/Oriented   Current cognitive status: Alert/Oriented   Walking or Climbing Stairs ambulation difficulty, requires equipment   Equipment Currently Used at Home cane, straight;walker, rolling   Readmission within 30 days? No   Do you currently have service(s) that help you manage your care at home? No   Do you take prescription medications? Yes   Do you have prescription coverage? Yes   Coverage [employer] and Medicaid   Do you have any problems affording any of your prescribed medications? No   Who is going to help you get home at discharge? Family will take home.   How do you get to doctors appointments? family or friend will provide   Are you on dialysis? No   Discharge Plan A Home Health   Discharge Plan B Home with family   Discharge Plan discussed with: Patient;Adult children

## 2023-04-29 PROCEDURE — G0180 PR HOME HEALTH MD CERTIFICATION: ICD-10-PCS | Mod: ,,, | Performed by: FAMILY MEDICINE

## 2023-04-29 PROCEDURE — G0180 MD CERTIFICATION HHA PATIENT: HCPCS | Mod: ,,, | Performed by: FAMILY MEDICINE

## 2023-05-01 ENCOUNTER — TELEPHONE (OUTPATIENT)
Dept: FAMILY MEDICINE | Facility: CLINIC | Age: 83
End: 2023-05-01

## 2023-05-10 ENCOUNTER — HOSPITAL ENCOUNTER (OUTPATIENT)
Dept: CARDIOLOGY | Facility: HOSPITAL | Age: 83
Discharge: HOME | End: 2023-05-10
Attending: INTERNAL MEDICINE
Payer: MEDICARE

## 2023-05-10 DIAGNOSIS — R42 DIZZINESS AND GIDDINESS: ICD-10-CM

## 2023-05-10 DIAGNOSIS — R42 DIZZINESS AND GIDDINESS: Primary | ICD-10-CM

## 2023-05-10 PROCEDURE — 93225 XTRNL ECG REC<48 HRS REC: CPT

## 2023-05-10 PROCEDURE — 93226 XTRNL ECG REC<48 HR SCAN A/R: CPT

## 2023-05-15 LAB
OHS CV EVENT MONITOR DAY: 0
OHS CV HOLTER LENGTH DECIMAL HOURS: 24
OHS CV HOLTER LENGTH HOURS: 24
OHS CV HOLTER LENGTH MINUTES: 0
OHS CV HOLTER SINUS AVERAGE HR: 80
OHS CV HOLTER SINUS MAX HR: 115
OHS CV HOLTER SINUS MIN HR: 64

## 2023-05-26 DIAGNOSIS — G47.00 INSOMNIA, UNSPECIFIED TYPE: Chronic | ICD-10-CM

## 2023-05-26 RX ORDER — CLONAZEPAM 0.5 MG/1
TABLET ORAL
Qty: 30 TABLET | Refills: 5 | Status: SHIPPED | OUTPATIENT
Start: 2023-05-26 | End: 2023-10-31

## 2023-05-30 ENCOUNTER — TELEPHONE (OUTPATIENT)
Dept: FAMILY MEDICINE | Facility: CLINIC | Age: 83
End: 2023-05-30
Payer: MEDICAID

## 2023-05-30 ENCOUNTER — EXTERNAL HOME HEALTH (OUTPATIENT)
Dept: HOME HEALTH SERVICES | Facility: HOSPITAL | Age: 83
End: 2023-05-30
Payer: MEDICARE

## 2023-06-19 ENCOUNTER — OFFICE VISIT (OUTPATIENT)
Dept: FAMILY MEDICINE | Facility: CLINIC | Age: 83
End: 2023-06-19
Payer: MEDICARE

## 2023-06-19 VITALS
RESPIRATION RATE: 16 BRPM | OXYGEN SATURATION: 98 % | SYSTOLIC BLOOD PRESSURE: 138 MMHG | HEART RATE: 87 BPM | TEMPERATURE: 98 F | HEIGHT: 63 IN | DIASTOLIC BLOOD PRESSURE: 85 MMHG | BODY MASS INDEX: 43.91 KG/M2 | WEIGHT: 247.81 LBS

## 2023-06-19 DIAGNOSIS — M54.50 CHRONIC MIDLINE LOW BACK PAIN WITHOUT SCIATICA: ICD-10-CM

## 2023-06-19 DIAGNOSIS — D68.9 BLEEDING DISORDER DUE TO CONSUMPTION OF COAGULANTS: ICD-10-CM

## 2023-06-19 DIAGNOSIS — T45.7X1A BLEEDING DISORDER DUE TO CONSUMPTION OF COAGULANTS: ICD-10-CM

## 2023-06-19 DIAGNOSIS — I10 ESSENTIAL HYPERTENSION: Primary | ICD-10-CM

## 2023-06-19 DIAGNOSIS — G89.29 CHRONIC MIDLINE LOW BACK PAIN WITHOUT SCIATICA: ICD-10-CM

## 2023-06-19 DIAGNOSIS — D63.8 ANEMIA OF CHRONIC DISEASE: ICD-10-CM

## 2023-06-19 PROBLEM — I35.0 NODULAR CALCIFIC AORTIC VALVE STENOSIS: Status: ACTIVE | Noted: 2023-04-11

## 2023-06-19 PROCEDURE — 1159F PR MEDICATION LIST DOCUMENTED IN MEDICAL RECORD: ICD-10-PCS | Mod: CPTII,,, | Performed by: FAMILY MEDICINE

## 2023-06-19 PROCEDURE — 3288F PR FALLS RISK ASSESSMENT DOCUMENTED: ICD-10-PCS | Mod: CPTII,,, | Performed by: FAMILY MEDICINE

## 2023-06-19 PROCEDURE — 1126F PR PAIN SEVERITY QUANTIFIED, NO PAIN PRESENT: ICD-10-PCS | Mod: CPTII,,, | Performed by: FAMILY MEDICINE

## 2023-06-19 PROCEDURE — 1100F PR PT FALLS ASSESS DOC 2+ FALLS/FALL W/INJURY/YR: ICD-10-PCS | Mod: CPTII,,, | Performed by: FAMILY MEDICINE

## 2023-06-19 PROCEDURE — 99214 PR OFFICE/OUTPT VISIT, EST, LEVL IV, 30-39 MIN: ICD-10-PCS | Mod: ,,, | Performed by: FAMILY MEDICINE

## 2023-06-19 PROCEDURE — 3075F SYST BP GE 130 - 139MM HG: CPT | Mod: CPTII,,, | Performed by: FAMILY MEDICINE

## 2023-06-19 PROCEDURE — 3288F FALL RISK ASSESSMENT DOCD: CPT | Mod: CPTII,,, | Performed by: FAMILY MEDICINE

## 2023-06-19 PROCEDURE — 99214 OFFICE O/P EST MOD 30 MIN: CPT | Mod: ,,, | Performed by: FAMILY MEDICINE

## 2023-06-19 PROCEDURE — 3079F PR MOST RECENT DIASTOLIC BLOOD PRESSURE 80-89 MM HG: ICD-10-PCS | Mod: CPTII,,, | Performed by: FAMILY MEDICINE

## 2023-06-19 PROCEDURE — 1100F PTFALLS ASSESS-DOCD GE2>/YR: CPT | Mod: CPTII,,, | Performed by: FAMILY MEDICINE

## 2023-06-19 PROCEDURE — 1126F AMNT PAIN NOTED NONE PRSNT: CPT | Mod: CPTII,,, | Performed by: FAMILY MEDICINE

## 2023-06-19 PROCEDURE — 3079F DIAST BP 80-89 MM HG: CPT | Mod: CPTII,,, | Performed by: FAMILY MEDICINE

## 2023-06-19 PROCEDURE — 1160F RVW MEDS BY RX/DR IN RCRD: CPT | Mod: CPTII,,, | Performed by: FAMILY MEDICINE

## 2023-06-19 PROCEDURE — 1159F MED LIST DOCD IN RCRD: CPT | Mod: CPTII,,, | Performed by: FAMILY MEDICINE

## 2023-06-19 PROCEDURE — 1160F PR REVIEW ALL MEDS BY PRESCRIBER/CLIN PHARMACIST DOCUMENTED: ICD-10-PCS | Mod: CPTII,,, | Performed by: FAMILY MEDICINE

## 2023-06-19 PROCEDURE — 3075F PR MOST RECENT SYSTOLIC BLOOD PRESS GE 130-139MM HG: ICD-10-PCS | Mod: CPTII,,, | Performed by: FAMILY MEDICINE

## 2023-06-19 RX ORDER — TIZANIDINE 4 MG/1
4 TABLET ORAL 3 TIMES DAILY PRN
COMMUNITY
Start: 2023-06-06 | End: 2023-06-19

## 2023-06-19 RX ORDER — METOPROLOL SUCCINATE 25 MG/1
25 TABLET, EXTENDED RELEASE ORAL NIGHTLY
COMMUNITY
Start: 2023-05-30

## 2023-06-19 RX ORDER — AMLODIPINE BESYLATE 5 MG/1
5 TABLET ORAL DAILY
COMMUNITY
Start: 2023-05-30

## 2023-06-19 NOTE — PROGRESS NOTES
Patient ID: [id number]     Chief Complaint: Follow-up        HPI:     Gwendolyn Trejo is a 82 y.o. female here today for Follow-up for chronic medical conditions and low back pain.       ----------------------------  Anemia of chronic disease  Anxiety disorder  Aortic stenosis  Atrial fibrillation  CAD (coronary artery disease)  Coronary arteriosclerosis  Crushing injury of right foot  Diastolic dysfunction  Diverticulosis  History of Coumadin therapy  History of transient ischemic attack (TIA)  HTN (hypertension)  JOAQUIN (iron deficiency anemia)  Inflamed seborrheic keratosis  Insomnia  Lumbar radiculopathy  Mitral regurgitation  Neovascular age-related macular degeneration  Nodular calcific aortic valve stenosis  Pseudophakia of both eyes  Renal scarring  S/P TAVR (transcatheter aortic valve replacement)  Sciatica  Severe aortic stenosis  Transient cerebral ischemia     Past Surgical History:   Procedure Laterality Date    ANKLE GANGLION CYST EXCISION Left     CARDIAC CATHETERIZATION  2013    Dr Yadav    CATARACT EXTRACTION Bilateral      SECTION       SECTION      TRANSCATHETER AORTIC VALVE REPLACEMENT (TAVR)  2023    Dr Mauricio Horn       Review of patient's allergies indicates:   Allergen Reactions    Penicillins Hives    Penicillin        Outpatient Medications Marked as Taking for the 23 encounter (Office Visit) with Estuardo Soriano, DO   Medication Sig Dispense Refill    amLODIPine (NORVASC) 5 MG tablet Take 5 mg by mouth once daily.      clonazePAM (KLONOPIN) 0.5 MG tablet TAKE ONE TABLET BY MOUTH DAILY EVERY EVENING 30 tablet 5    ELIQUIS 5 mg Tab Take 5 mg by mouth 2 (two) times daily.      furosemide (LASIX) 40 MG tablet TAKE ONE TABLET BY MOUTH EVERY MORNING 90 tablet 1    hydrALAZINE (APRESOLINE) 50 MG tablet Take 2 tablets (100 mg total) by mouth every 12 (twelve) hours. (Patient taking differently: Take 10 mg by mouth 3 (three) times daily.) 120 tablet 11     losartan (COZAAR) 100 MG tablet TAKE ONE TABLET BY MOUTH DAILY FOR BLOOD PRESSURE 90 tablet 1    metoprolol succinate (TOPROL-XL) 25 MG 24 hr tablet Take 25 mg by mouth nightly.         Social History     Socioeconomic History    Marital status:    Tobacco Use    Smoking status: Never    Smokeless tobacco: Never   Substance and Sexual Activity    Alcohol use: Never    Drug use: Never    Sexual activity: Not Currently     Social Determinants of Health     Financial Resource Strain: Low Risk     Difficulty of Paying Living Expenses: Not hard at all   Food Insecurity: No Food Insecurity    Worried About Running Out of Food in the Last Year: Never true    Ran Out of Food in the Last Year: Never true   Transportation Needs: No Transportation Needs    Lack of Transportation (Medical): No    Lack of Transportation (Non-Medical): No   Physical Activity: Insufficiently Active    Days of Exercise per Week: 5 days    Minutes of Exercise per Session: 20 min   Stress: Stress Concern Present    Feeling of Stress : Rather much   Social Connections: Moderately Integrated    Frequency of Communication with Friends and Family: More than three times a week    Frequency of Social Gatherings with Friends and Family: Three times a week    Attends Voodoo Services: More than 4 times per year    Active Member of Clubs or Organizations: Yes    Attends Club or Organization Meetings: 1 to 4 times per year    Marital Status:    Housing Stability: Low Risk     Unable to Pay for Housing in the Last Year: No    Number of Places Lived in the Last Year: 1    Unstable Housing in the Last Year: No        Family History   Problem Relation Age of Onset    Heart failure Mother     Parkinsonism Father     Valvular heart disease Sister         infant - cause of death        Patient Care Team:  Estuardo Soriano DO as PCP - General (Family Medicine)  Salbador Scott Jr., MD as Consulting Physician (Ophthalmology)  Daniele Marie MD as  "Consulting Physician (Cardiology)  Jose Yadav MD as Consulting Physician (Vascular Surgery)  Mauricio Horn MD as Consulting Physician (Cardiology)  Steven Lozada (Home Health Services)     Subjective:     Review of Systems   Constitutional:  Negative for chills and fever.   Respiratory:  Negative for shortness of breath.    Cardiovascular:  Negative for chest pain.   Gastrointestinal:  Negative for constipation and diarrhea.   Musculoskeletal:  Positive for back pain.   Neurological:  Negative for dizziness and headaches.   Psychiatric/Behavioral:  The patient does not have insomnia.      See HPI for details  All Other ROS: Negative except as stated in HPI.       Objective:     /85   Pulse 87   Temp 98.1 °F (36.7 °C) (Tympanic)   Resp 16   Ht 5' 2.99" (1.6 m)   Wt 112.4 kg (247 lb 12.8 oz)   SpO2 98%   BMI 43.91 kg/m²     Physical Exam  Vitals reviewed.   Constitutional:       General: She is not in acute distress.     Appearance: Normal appearance.   Cardiovascular:      Rate and Rhythm: Normal rate and regular rhythm.      Heart sounds: No murmur heard.    No friction rub. No gallop.   Pulmonary:      Effort: No respiratory distress.      Breath sounds: No wheezing, rhonchi or rales.   Musculoskeletal:         General: No swelling, tenderness or deformity.      Right lower leg: No edema.      Left lower leg: No edema.   Skin:     General: Skin is warm and dry.      Findings: No lesion or rash.   Neurological:      General: No focal deficit present.      Mental Status: She is alert.   Psychiatric:         Mood and Affect: Mood normal.       Assessment/Plan:     1. Essential hypertension  well controlled on current prescription medication    2. 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   3. Anemia of chronic disease  Combination of iron deficiency and anticoagulant use  4. Chronic midline low back pain without sciatica  Recommended " tylenol and diclofenac gel.         Follow up:     Follow up in about 6 months (around 12/19/2023) for Medicare Wellness. In addition to their scheduled follow up, the patient has also been instructed to follow up on as needed basis.

## 2023-07-09 LAB
AV INDEX (PROSTH): 0.38
AV MEAN GRADIENT: 28 MMHG
AV PEAK GRADIENT: 49 MMHG
AV VALVE AREA: 1.19 CM2
AV VELOCITY RATIO: 0.31
CV ECHO LV RWT: 0.51 CM
DOP CALC AO PEAK VEL: 3.5 M/S
DOP CALC AO VTI: 58.5 CM
DOP CALC LVOT AREA: 3.1 CM2
DOP CALC LVOT DIAMETER: 2 CM
DOP CALC LVOT PEAK VEL: 1.09 M/S
DOP CALC LVOT STROKE VOLUME: 69.71 CM3
DOP CALC MV VTI: 38.7 CM
DOP CALCLVOT PEAK VEL VTI: 22.2 CM
E WAVE DECELERATION TIME: 166 MSEC
E/A RATIO: 2.42
E/E' RATIO: 14.09 M/S
ECHO LV POSTERIOR WALL: 1.24 CM (ref 0.6–1.1)
EJECTION FRACTION: 50 %
FRACTIONAL SHORTENING: 26 % (ref 28–44)
HR MV ECHO: 99 BPM
INTERVENTRICULAR SEPTUM: 1.31 CM (ref 0.6–1.1)
LEFT ATRIUM SIZE: 6.6 CM
LEFT ATRIUM VOLUME MOD: 114 CM3
LEFT INTERNAL DIMENSION IN SYSTOLE: 3.65 CM (ref 2.1–4)
LEFT VENTRICLE DIASTOLIC VOLUME: 113 ML
LEFT VENTRICLE SYSTOLIC VOLUME: 56.3 ML
LEFT VENTRICULAR INTERNAL DIMENSION IN DIASTOLE: 4.9 CM (ref 3.5–6)
LEFT VENTRICULAR MASS: 246.74 G
LV LATERAL E/E' RATIO: 11.57 M/S
LV SEPTAL E/E' RATIO: 18 M/S
LVOT MG: 3 MMHG
LVOT MV: 0.81 CM/S
MV MEAN GRADIENT: 5 MMHG
MV PEAK A VEL: 0.67 M/S
MV PEAK E VEL: 1.62 M/S
MV PEAK GRADIENT: 12 MMHG
MV VALVE AREA BY CONTINUITY EQUATION: 1.8 CM2
OHS LV EJECTION FRACTION SIMPSONS BIPLANE MOD: 5 %
PISA TR MAX VEL: 1.38 M/S
RA PRESSURE: 8 MMHG
SINUS: 2.9 CM
TDI LATERAL: 0.14 M/S
TDI SEPTAL: 0.09 M/S
TDI: 0.12 M/S
TR MAX PG: 8 MMHG
TRICUSPID ANNULAR PLANE SYSTOLIC EXCURSION: 1.83 CM
TV REST PULMONARY ARTERY PRESSURE: 16 MMHG

## 2023-07-14 ENCOUNTER — TELEPHONE (OUTPATIENT)
Dept: FAMILY MEDICINE | Facility: CLINIC | Age: 83
End: 2023-07-14
Payer: MEDICAID

## 2023-07-14 DIAGNOSIS — M54.9 BACK PAIN, UNSPECIFIED BACK LOCATION, UNSPECIFIED BACK PAIN LATERALITY, UNSPECIFIED CHRONICITY: Primary | ICD-10-CM

## 2023-07-14 RX ORDER — TRAMADOL HYDROCHLORIDE 50 MG/1
50 TABLET ORAL EVERY 8 HOURS PRN
Qty: 12 TABLET | Refills: 0 | Status: SHIPPED | OUTPATIENT
Start: 2023-07-14 | End: 2023-07-26 | Stop reason: SDUPTHER

## 2023-07-14 NOTE — TELEPHONE ENCOUNTER
----- Message from Dori Thomas sent at 7/14/2023  8:36 AM CDT -----  Regarding: med  Mrs Sharon called wondering if doc could send in anti inflammatory for Mrs Snow, she has that pain in her hip due to sciatic nerve please give Mrs Price a call before we leave today to let her know if he sends her in anything [phone]      Thanks

## 2023-07-14 NOTE — TELEPHONE ENCOUNTER
Antiinflammatory medications are risky because she is also taking Eliquis. This increase her risk of developing a GI bleed. Recommend heat and topical NSAIDS such as diclofenac Gel 4 times a day which is OTC.

## 2023-07-26 DIAGNOSIS — M54.9 BACK PAIN, UNSPECIFIED BACK LOCATION, UNSPECIFIED BACK PAIN LATERALITY, UNSPECIFIED CHRONICITY: ICD-10-CM

## 2023-07-26 RX ORDER — TRAMADOL HYDROCHLORIDE 50 MG/1
50 TABLET ORAL EVERY 8 HOURS PRN
Qty: 21 TABLET | Refills: 0 | Status: SHIPPED | OUTPATIENT
Start: 2023-07-26 | End: 2023-08-18

## 2023-07-26 NOTE — TELEPHONE ENCOUNTER
Pt daughter Sharon called stating tramadol is really helping pt with her pain and she's been tolerating it well. Would like refill.

## 2023-08-03 NOTE — DISCHARGE SUMMARY
ADMISSION INFORMATION     Admit Date: 4/25/2023 Primary Care Physician: Estuardo Soriano DO   Admitting Physician: Mauricio Horn MD Primary Care Phone: [phone]    Consulting Provider(s):     DISCHARGE INFORMATION     Discharge Date: 8/3/2023  Primary Discharge Diagnosis: Bradycardia   Discharge Physician: No att. providers found Secondary Discharge Diagnosis:   Active Hospital Problems    Diagnosis  POA    *Bradycardia [R00.1]  Yes    Morbid obesity [E66.01]  Yes    Atrial fibrillation [I48.91]  Yes    Coronary arteriosclerosis [I25.10]  Yes    Essential hypertension [I10]  Yes      Resolved Hospital Problems   No resolved problems to display.            Discharge Condition: stable     Discharge Disposition: home     DETAILS OF HOSPITAL STAY     Presenting Problem: Bradycardia [R00.1]    Hospital Course: 83 yo with history of PAF and AS (s/p TAVR) presenting for symptomatic bradycardia. On multiple AV tracy blocking agents on presentation. Now in a fib with controlled ventricular response. Stopped AV tracy blockers and BRadycardia resolved.   Significant Diagnostic Studies/Procedures:   Complications: none       Physical Exam   Constitutional:Oriented to person, place, and time and well-developed, well-nourished, and in no distress.   Neck: Normal range of motion. Neck supple.   Cardiovascular: Normal rate, regular rhythm and normal heart sounds. NSR   Pulmonary/Chest: Effort normal and breath sounds normal, but diminished at the bases otherwise clear to auscultation patient is on room air  Abdominal: Soft. Bowel sounds are normal. There is no tenderness.   Musculoskeletal: Normal range of motion.   Neurological: Alert and oriented to person, place, and time. Gait normal.   Skin: Skin is warm and dry.   Psychiatric: Affect normal.   DISCHARGE PLAN        Medication List      START taking these medications     hydrALAZINE 50 MG tablet; Commonly known as: APRESOLINE; Take 2 tablets   (100 mg total) by mouth  every 12 (twelve) hours.     CONTINUE taking these medications     ELIQUIS 5 mg Tab; Generic drug: apixaban   furosemide 40 MG tablet; Commonly known as: LASIX; TAKE ONE TABLET BY   MOUTH EVERY MORNING   losartan 100 MG tablet; Commonly known as: COZAAR; TAKE ONE TABLET BY   MOUTH DAILY FOR BLOOD PRESSURE   SLOW RELEASE IRON 140 mg (45 mg iron) Tbsr; Generic drug: ferrous   sulfate     STOP taking these medications     carvediloL 3.125 MG tablet; Commonly known as: COREG   verapamiL 240 MG CR tablet; Commonly known as: CALAN-SR     An After Visit Summary was printed and given to the patient.      Future Appointments   Date Time Provider Department Center   1/24/2024 10:30 AM Estuardo Soriano DO [facility]        Time Spent < 30 min

## 2023-08-18 DIAGNOSIS — M54.9 BACK PAIN, UNSPECIFIED BACK LOCATION, UNSPECIFIED BACK PAIN LATERALITY, UNSPECIFIED CHRONICITY: ICD-10-CM

## 2023-08-18 RX ORDER — TRAMADOL HYDROCHLORIDE 50 MG/1
50 TABLET ORAL EVERY 8 HOURS PRN
Qty: 21 TABLET | Refills: 0 | Status: SHIPPED | OUTPATIENT
Start: 2023-08-18 | End: 2023-09-07 | Stop reason: SDUPTHER

## 2023-09-07 DIAGNOSIS — M54.9 BACK PAIN, UNSPECIFIED BACK LOCATION, UNSPECIFIED BACK PAIN LATERALITY, UNSPECIFIED CHRONICITY: ICD-10-CM

## 2023-09-07 RX ORDER — TRAMADOL HYDROCHLORIDE 50 MG/1
50 TABLET ORAL EVERY 8 HOURS PRN
Qty: 21 TABLET | Refills: 0 | Status: SHIPPED | OUTPATIENT
Start: 2023-09-07 | End: 2023-09-26 | Stop reason: SDUPTHER

## 2023-09-25 DIAGNOSIS — I10 ESSENTIAL HYPERTENSION: ICD-10-CM

## 2023-09-25 RX ORDER — FUROSEMIDE 40 MG/1
TABLET ORAL
Qty: 90 TABLET | Refills: 1 | Status: ON HOLD | OUTPATIENT
Start: 2023-09-25 | End: 2024-02-02

## 2023-09-26 DIAGNOSIS — M54.9 BACK PAIN, UNSPECIFIED BACK LOCATION, UNSPECIFIED BACK PAIN LATERALITY, UNSPECIFIED CHRONICITY: ICD-10-CM

## 2023-09-26 RX ORDER — TRAMADOL HYDROCHLORIDE 50 MG/1
50 TABLET ORAL EVERY 8 HOURS PRN
Qty: 21 TABLET | Refills: 0 | Status: SHIPPED | OUTPATIENT
Start: 2023-09-26 | End: 2024-01-31

## 2023-09-26 NOTE — TELEPHONE ENCOUNTER
She should not be requiring this much tramadol still. She needs to work on weaning herself off slowly and if she is still having that much pain she should be re-evaluated to plan next steps.

## 2023-10-16 DIAGNOSIS — I10 ESSENTIAL HYPERTENSION: ICD-10-CM

## 2023-10-16 RX ORDER — LOSARTAN POTASSIUM 100 MG/1
TABLET ORAL
Qty: 90 TABLET | Refills: 1 | Status: SHIPPED | OUTPATIENT
Start: 2023-10-16

## 2023-10-24 DIAGNOSIS — G47.00 INSOMNIA, UNSPECIFIED TYPE: Chronic | ICD-10-CM

## 2023-10-31 RX ORDER — CLONAZEPAM 0.5 MG/1
TABLET ORAL
Qty: 30 TABLET | Refills: 5 | Status: SHIPPED | OUTPATIENT
Start: 2023-10-31

## 2024-01-19 DIAGNOSIS — R68.89 FORGETFULNESS: ICD-10-CM

## 2024-01-19 DIAGNOSIS — M85.9 DISORDER OF BONE DENSITY AND STRUCTURE, UNSPECIFIED: ICD-10-CM

## 2024-01-19 DIAGNOSIS — I10 ESSENTIAL HYPERTENSION: ICD-10-CM

## 2024-01-19 DIAGNOSIS — R35.0 URINARY FREQUENCY: ICD-10-CM

## 2024-01-19 DIAGNOSIS — Z00.00 WELLNESS EXAMINATION: Primary | ICD-10-CM

## 2024-01-23 ENCOUNTER — LAB VISIT (OUTPATIENT)
Dept: LAB | Facility: HOSPITAL | Age: 84
End: 2024-01-23
Attending: FAMILY MEDICINE
Payer: MEDICARE

## 2024-01-23 DIAGNOSIS — I10 ESSENTIAL HYPERTENSION: ICD-10-CM

## 2024-01-23 DIAGNOSIS — M85.9 DISORDER OF BONE DENSITY AND STRUCTURE, UNSPECIFIED: ICD-10-CM

## 2024-01-23 DIAGNOSIS — R68.89 FORGETFULNESS: ICD-10-CM

## 2024-01-23 DIAGNOSIS — R35.0 URINARY FREQUENCY: ICD-10-CM

## 2024-01-23 DIAGNOSIS — Z00.00 WELLNESS EXAMINATION: ICD-10-CM

## 2024-01-23 LAB
ALBUMIN SERPL-MCNC: 3.9 G/DL (ref 3.4–4.8)
ALBUMIN/GLOB SERPL: 1.2 RATIO (ref 1.1–2)
ALP SERPL-CCNC: 56 UNIT/L (ref 40–150)
ALT SERPL-CCNC: 15 UNIT/L (ref 0–55)
APPEARANCE UR: CLEAR
AST SERPL-CCNC: 18 UNIT/L (ref 5–34)
BASOPHILS # BLD AUTO: 0.08 X10(3)/MCL
BASOPHILS NFR BLD AUTO: 1.3 %
BILIRUB SERPL-MCNC: 1.1 MG/DL
BILIRUB UR QL STRIP.AUTO: NEGATIVE
BUN SERPL-MCNC: 25 MG/DL (ref 9.8–20.1)
CALCIUM SERPL-MCNC: 9 MG/DL (ref 8.4–10.2)
CHLORIDE SERPL-SCNC: 102 MMOL/L (ref 98–107)
CHOLEST SERPL-MCNC: 152 MG/DL
CHOLEST/HDLC SERPL: 3 {RATIO} (ref 0–5)
CO2 SERPL-SCNC: 26 MMOL/L (ref 23–31)
COLOR UR AUTO: YELLOW
CREAT SERPL-MCNC: 0.95 MG/DL (ref 0.55–1.02)
DEPRECATED CALCIDIOL+CALCIFEROL SERPL-MC: 26.4 NG/ML (ref 30–80)
EOSINOPHIL # BLD AUTO: 0.17 X10(3)/MCL (ref 0–0.9)
EOSINOPHIL NFR BLD AUTO: 2.8 %
ERYTHROCYTE [DISTWIDTH] IN BLOOD BY AUTOMATED COUNT: 14.9 % (ref 11.5–17)
GFR SERPLBLD CREATININE-BSD FMLA CKD-EPI: 59 MLS/MIN/1.73/M2
GLOBULIN SER-MCNC: 3.2 GM/DL (ref 2.4–3.5)
GLUCOSE SERPL-MCNC: 126 MG/DL (ref 82–115)
GLUCOSE UR QL STRIP.AUTO: NEGATIVE
HCT VFR BLD AUTO: 35.6 % (ref 37–47)
HDLC SERPL-MCNC: 59 MG/DL (ref 35–60)
HGB BLD-MCNC: 10.6 G/DL (ref 12–16)
IMM GRANULOCYTES # BLD AUTO: 0.02 X10(3)/MCL (ref 0–0.04)
IMM GRANULOCYTES NFR BLD AUTO: 0.3 %
KETONES UR QL STRIP.AUTO: NEGATIVE
LDLC SERPL CALC-MCNC: 80 MG/DL (ref 50–140)
LEUKOCYTE ESTERASE UR QL STRIP.AUTO: NEGATIVE
LYMPHOCYTES # BLD AUTO: 1.08 X10(3)/MCL (ref 0.6–4.6)
LYMPHOCYTES NFR BLD AUTO: 17.6 %
MCH RBC QN AUTO: 26 PG (ref 27–31)
MCHC RBC AUTO-ENTMCNC: 29.8 G/DL (ref 33–36)
MCV RBC AUTO: 87.5 FL (ref 80–94)
MONOCYTES # BLD AUTO: 0.51 X10(3)/MCL (ref 0.1–1.3)
MONOCYTES NFR BLD AUTO: 8.3 %
NEUTROPHILS # BLD AUTO: 4.27 X10(3)/MCL (ref 2.1–9.2)
NEUTROPHILS NFR BLD AUTO: 69.7 %
NITRITE UR QL STRIP.AUTO: NEGATIVE
NRBC BLD AUTO-RTO: 0 %
PH UR STRIP.AUTO: 6.5 [PH]
PLATELET # BLD AUTO: 202 X10(3)/MCL (ref 130–400)
PMV BLD AUTO: 10.6 FL (ref 7.4–10.4)
POTASSIUM SERPL-SCNC: 3.9 MMOL/L (ref 3.5–5.1)
PROT SERPL-MCNC: 7.1 GM/DL (ref 5.8–7.6)
PROT UR QL STRIP.AUTO: NEGATIVE
RBC # BLD AUTO: 4.07 X10(6)/MCL (ref 4.2–5.4)
RBC UR QL AUTO: NEGATIVE
SODIUM SERPL-SCNC: 138 MMOL/L (ref 136–145)
SP GR UR STRIP.AUTO: 1.01 (ref 1–1.03)
TRIGL SERPL-MCNC: 63 MG/DL (ref 37–140)
TSH SERPL-ACNC: 1.96 UIU/ML (ref 0.35–4.94)
UROBILINOGEN UR STRIP-ACNC: 1
VLDLC SERPL CALC-MCNC: 13 MG/DL
WBC # SPEC AUTO: 6.13 X10(3)/MCL (ref 4.5–11.5)

## 2024-01-23 PROCEDURE — 80053 COMPREHEN METABOLIC PANEL: CPT

## 2024-01-23 PROCEDURE — 81003 URINALYSIS AUTO W/O SCOPE: CPT

## 2024-01-23 PROCEDURE — 84443 ASSAY THYROID STIM HORMONE: CPT

## 2024-01-23 PROCEDURE — 82306 VITAMIN D 25 HYDROXY: CPT

## 2024-01-23 PROCEDURE — 85025 COMPLETE CBC W/AUTO DIFF WBC: CPT

## 2024-01-23 PROCEDURE — 80061 LIPID PANEL: CPT

## 2024-01-23 PROCEDURE — 36415 COLL VENOUS BLD VENIPUNCTURE: CPT

## 2024-01-24 ENCOUNTER — TELEPHONE (OUTPATIENT)
Dept: FAMILY MEDICINE | Facility: CLINIC | Age: 84
End: 2024-01-24

## 2024-01-31 ENCOUNTER — OFFICE VISIT (OUTPATIENT)
Dept: FAMILY MEDICINE | Facility: CLINIC | Age: 84
End: 2024-01-31
Payer: MEDICARE

## 2024-01-31 ENCOUNTER — HOSPITAL ENCOUNTER (INPATIENT)
Facility: HOSPITAL | Age: 84
LOS: 2 days | Discharge: HOME HEALTH | DRG: 291 | End: 2024-02-02
Attending: INTERNAL MEDICINE | Admitting: INTERNAL MEDICINE
Payer: MEDICARE

## 2024-01-31 ENCOUNTER — HOSPITAL ENCOUNTER (EMERGENCY)
Facility: HOSPITAL | Age: 84
Discharge: ANOTHER HOSPITAL | End: 2024-01-31
Attending: FAMILY MEDICINE
Payer: MEDICARE

## 2024-01-31 ENCOUNTER — HOSPITAL ENCOUNTER (OUTPATIENT)
Dept: RADIOLOGY | Facility: HOSPITAL | Age: 84
Discharge: HOME | End: 2024-01-31
Payer: MEDICARE

## 2024-01-31 ENCOUNTER — TELEPHONE (OUTPATIENT)
Dept: FAMILY MEDICINE | Facility: CLINIC | Age: 84
End: 2024-01-31

## 2024-01-31 VITALS
BODY MASS INDEX: 47.38 KG/M2 | SYSTOLIC BLOOD PRESSURE: 164 MMHG | HEART RATE: 86 BPM | WEIGHT: 267.38 LBS | DIASTOLIC BLOOD PRESSURE: 101 MMHG | OXYGEN SATURATION: 90 % | RESPIRATION RATE: 20 BRPM | HEIGHT: 63 IN

## 2024-01-31 VITALS
SYSTOLIC BLOOD PRESSURE: 189 MMHG | OXYGEN SATURATION: 93 % | WEIGHT: 267 LBS | TEMPERATURE: 98 F | RESPIRATION RATE: 24 BRPM | BODY MASS INDEX: 47.31 KG/M2 | DIASTOLIC BLOOD PRESSURE: 92 MMHG | HEART RATE: 83 BPM | HEIGHT: 63 IN

## 2024-01-31 DIAGNOSIS — N28.9 KIDNEY DISEASE: ICD-10-CM

## 2024-01-31 DIAGNOSIS — I50.9 CHF EXACERBATION: ICD-10-CM

## 2024-01-31 DIAGNOSIS — R60.0 BILATERAL LOWER EXTREMITY EDEMA: ICD-10-CM

## 2024-01-31 DIAGNOSIS — I50.9 CHF (CONGESTIVE HEART FAILURE): ICD-10-CM

## 2024-01-31 DIAGNOSIS — I10 ESSENTIAL HYPERTENSION: ICD-10-CM

## 2024-01-31 DIAGNOSIS — D63.8 ANEMIA OF CHRONIC DISEASE: ICD-10-CM

## 2024-01-31 DIAGNOSIS — I50.9 CONGESTIVE HEART FAILURE, UNSPECIFIED HF CHRONICITY, UNSPECIFIED HEART FAILURE TYPE: ICD-10-CM

## 2024-01-31 DIAGNOSIS — R07.9 CHEST PAIN: ICD-10-CM

## 2024-01-31 DIAGNOSIS — R09.02 HYPOXIA: Primary | ICD-10-CM

## 2024-01-31 DIAGNOSIS — R06.02 SOB (SHORTNESS OF BREATH): ICD-10-CM

## 2024-01-31 DIAGNOSIS — R06.02 SOB (SHORTNESS OF BREATH): Primary | ICD-10-CM

## 2024-01-31 DIAGNOSIS — I48.91 ATRIAL FIBRILLATION: ICD-10-CM

## 2024-01-31 PROBLEM — I50.33 ACUTE ON CHRONIC HEART FAILURE WITH PRESERVED EJECTION FRACTION (HFPEF): Status: ACTIVE | Noted: 2024-01-31

## 2024-01-31 LAB
CK MB SERPL-MCNC: 2.2 NG/ML
CK SERPL-CCNC: 53 U/L (ref 29–168)
TROPONIN I SERPL-MCNC: 0.03 NG/ML (ref 0–0.04)

## 2024-01-31 PROCEDURE — 84484 ASSAY OF TROPONIN QUANT: CPT | Performed by: FAMILY MEDICINE

## 2024-01-31 PROCEDURE — 63600175 PHARM REV CODE 636 W HCPCS: Performed by: FAMILY MEDICINE

## 2024-01-31 PROCEDURE — 11000001 HC ACUTE MED/SURG PRIVATE ROOM

## 2024-01-31 PROCEDURE — 27000221 HC OXYGEN, UP TO 24 HOURS

## 2024-01-31 PROCEDURE — 25000003 PHARM REV CODE 250: Performed by: INTERNAL MEDICINE

## 2024-01-31 PROCEDURE — 82553 CREATINE MB FRACTION: CPT | Performed by: FAMILY MEDICINE

## 2024-01-31 PROCEDURE — 63600175 PHARM REV CODE 636 W HCPCS: Performed by: INTERNAL MEDICINE

## 2024-01-31 PROCEDURE — 96374 THER/PROPH/DIAG INJ IV PUSH: CPT

## 2024-01-31 PROCEDURE — 93005 ELECTROCARDIOGRAM TRACING: CPT

## 2024-01-31 PROCEDURE — 82550 ASSAY OF CK (CPK): CPT | Performed by: FAMILY MEDICINE

## 2024-01-31 PROCEDURE — 99284 EMERGENCY DEPT VISIT MOD MDM: CPT | Mod: 25

## 2024-01-31 PROCEDURE — 99215 OFFICE O/P EST HI 40 MIN: CPT | Mod: ,,,

## 2024-01-31 PROCEDURE — 71046 X-RAY EXAM CHEST 2 VIEWS: CPT | Mod: TC

## 2024-01-31 PROCEDURE — 93010 ELECTROCARDIOGRAM REPORT: CPT | Mod: ,,, | Performed by: INTERNAL MEDICINE

## 2024-01-31 RX ORDER — TALC
6 POWDER (GRAM) TOPICAL NIGHTLY PRN
Status: DISCONTINUED | OUTPATIENT
Start: 2024-01-31 | End: 2024-02-02 | Stop reason: HOSPADM

## 2024-01-31 RX ORDER — METOPROLOL SUCCINATE 25 MG/1
25 TABLET, EXTENDED RELEASE ORAL NIGHTLY
Status: DISCONTINUED | OUTPATIENT
Start: 2024-01-31 | End: 2024-01-31

## 2024-01-31 RX ORDER — ACETAMINOPHEN 500 MG
1000 TABLET ORAL EVERY 6 HOURS PRN
Status: DISCONTINUED | OUTPATIENT
Start: 2024-01-31 | End: 2024-02-02 | Stop reason: HOSPADM

## 2024-01-31 RX ORDER — HYDRALAZINE HYDROCHLORIDE 20 MG/ML
20 INJECTION INTRAMUSCULAR; INTRAVENOUS EVERY 4 HOURS PRN
Status: DISCONTINUED | OUTPATIENT
Start: 2024-01-31 | End: 2024-02-02 | Stop reason: HOSPADM

## 2024-01-31 RX ORDER — METOPROLOL SUCCINATE 50 MG/1
50 TABLET, EXTENDED RELEASE ORAL NIGHTLY
Status: DISCONTINUED | OUTPATIENT
Start: 2024-01-31 | End: 2024-02-02 | Stop reason: HOSPADM

## 2024-01-31 RX ORDER — CLONAZEPAM 0.5 MG/1
0.5 TABLET ORAL NIGHTLY
Status: DISCONTINUED | OUTPATIENT
Start: 2024-01-31 | End: 2024-02-02 | Stop reason: HOSPADM

## 2024-01-31 RX ORDER — ACETAMINOPHEN 325 MG/1
650 TABLET ORAL EVERY 4 HOURS PRN
Status: DISCONTINUED | OUTPATIENT
Start: 2024-01-31 | End: 2024-02-02 | Stop reason: HOSPADM

## 2024-01-31 RX ORDER — ONDANSETRON HYDROCHLORIDE 2 MG/ML
4 INJECTION, SOLUTION INTRAVENOUS EVERY 4 HOURS PRN
Status: DISCONTINUED | OUTPATIENT
Start: 2024-01-31 | End: 2024-02-02 | Stop reason: HOSPADM

## 2024-01-31 RX ORDER — MAGNESIUM SULFATE HEPTAHYDRATE 40 MG/ML
2 INJECTION, SOLUTION INTRAVENOUS ONCE
Status: COMPLETED | OUTPATIENT
Start: 2024-01-31 | End: 2024-02-01

## 2024-01-31 RX ORDER — AMLODIPINE BESYLATE 5 MG/1
5 TABLET ORAL DAILY
Status: DISCONTINUED | OUTPATIENT
Start: 2024-02-01 | End: 2024-02-01

## 2024-01-31 RX ORDER — POLYETHYLENE GLYCOL 3350 17 G/17G
17 POWDER, FOR SOLUTION ORAL 2 TIMES DAILY PRN
Status: DISCONTINUED | OUTPATIENT
Start: 2024-01-31 | End: 2024-02-02 | Stop reason: HOSPADM

## 2024-01-31 RX ORDER — ALUMINUM HYDROXIDE, MAGNESIUM HYDROXIDE, AND SIMETHICONE 1200; 120; 1200 MG/30ML; MG/30ML; MG/30ML
30 SUSPENSION ORAL 4 TIMES DAILY PRN
Status: DISCONTINUED | OUTPATIENT
Start: 2024-01-31 | End: 2024-02-02 | Stop reason: HOSPADM

## 2024-01-31 RX ORDER — FUROSEMIDE 10 MG/ML
20 INJECTION INTRAMUSCULAR; INTRAVENOUS
Status: COMPLETED | OUTPATIENT
Start: 2024-01-31 | End: 2024-01-31

## 2024-01-31 RX ORDER — SODIUM CHLORIDE 0.9 % (FLUSH) 0.9 %
10 SYRINGE (ML) INJECTION
Status: DISCONTINUED | OUTPATIENT
Start: 2024-01-31 | End: 2024-02-02 | Stop reason: HOSPADM

## 2024-01-31 RX ORDER — CLONIDINE HYDROCHLORIDE 0.2 MG/1
0.2 TABLET ORAL 3 TIMES DAILY PRN
Status: DISCONTINUED | OUTPATIENT
Start: 2024-01-31 | End: 2024-02-02 | Stop reason: HOSPADM

## 2024-01-31 RX ORDER — HYDRALAZINE HYDROCHLORIDE 25 MG/1
25 TABLET, FILM COATED ORAL 2 TIMES DAILY
Status: DISCONTINUED | OUTPATIENT
Start: 2024-01-31 | End: 2024-02-01

## 2024-01-31 RX ORDER — METOPROLOL TARTRATE 1 MG/ML
5 INJECTION, SOLUTION INTRAVENOUS EVERY 5 MIN PRN
Status: DISCONTINUED | OUTPATIENT
Start: 2024-01-31 | End: 2024-02-02 | Stop reason: HOSPADM

## 2024-01-31 RX ORDER — FUROSEMIDE 10 MG/ML
80 INJECTION INTRAMUSCULAR; INTRAVENOUS EVERY 12 HOURS
Status: DISCONTINUED | OUTPATIENT
Start: 2024-01-31 | End: 2024-02-02 | Stop reason: HOSPADM

## 2024-01-31 RX ORDER — LOSARTAN POTASSIUM 50 MG/1
100 TABLET ORAL DAILY
Status: DISCONTINUED | OUTPATIENT
Start: 2024-02-01 | End: 2024-02-02 | Stop reason: HOSPADM

## 2024-01-31 RX ORDER — PROCHLORPERAZINE EDISYLATE 5 MG/ML
5 INJECTION INTRAMUSCULAR; INTRAVENOUS EVERY 6 HOURS PRN
Status: DISCONTINUED | OUTPATIENT
Start: 2024-01-31 | End: 2024-02-02 | Stop reason: HOSPADM

## 2024-01-31 RX ORDER — AMOXICILLIN 250 MG
2 CAPSULE ORAL 2 TIMES DAILY PRN
Status: DISCONTINUED | OUTPATIENT
Start: 2024-01-31 | End: 2024-02-02 | Stop reason: HOSPADM

## 2024-01-31 RX ADMIN — FUROSEMIDE 20 MG: 10 INJECTION, SOLUTION INTRAMUSCULAR; INTRAVENOUS at 11:01

## 2024-01-31 RX ADMIN — CLONAZEPAM 0.5 MG: 0.5 TABLET ORAL at 09:01

## 2024-01-31 RX ADMIN — METOPROLOL SUCCINATE 50 MG: 50 TABLET, EXTENDED RELEASE ORAL at 09:01

## 2024-01-31 RX ADMIN — APIXABAN 5 MG: 5 TABLET, FILM COATED ORAL at 09:01

## 2024-01-31 RX ADMIN — MAGNESIUM SULFATE IN WATER 2 G: 40 INJECTION, SOLUTION INTRAVENOUS at 10:01

## 2024-01-31 RX ADMIN — FUROSEMIDE 80 MG: 10 INJECTION, SOLUTION INTRAVENOUS at 09:01

## 2024-01-31 RX ADMIN — HYDRALAZINE HYDROCHLORIDE 25 MG: 25 TABLET, FILM COATED ORAL at 09:01

## 2024-01-31 NOTE — ED PROVIDER NOTES
Encounter Date: 2024       History     Chief Complaint   Patient presents with    Shortness of Breath     SOB and fluid retention for the past month, sent by PCP.      This patient is an 83-year-old female who has a history of aortic valve replacements comes in with her daughter with the complaint of swelling over the past month.  Daughter concerned because she is become very short of breath to the point where she can not get up and walk to the bathroom on her own.  She had some outpatient labs done this morning which show that she has a BNP of 777.  Other than this, her lab work was normal.  Oxygen sat this morning as per her daughter was 89% on room air.  It is 89-90 here in the emergency room    The history is provided by the patient.     Review of patient's allergies indicates:   Allergen Reactions    Penicillins Hives    Penicillin      Past Medical History:   Diagnosis Date    Anemia of chronic disease     Anxiety disorder     Aortic stenosis     Atrial fibrillation     CAD (coronary artery disease)     Coronary arteriosclerosis     Crushing injury of right foot     Diastolic dysfunction     Diverticulosis     History of Coumadin therapy     History of transient ischemic attack (TIA)     HTN (hypertension)     JOAQUIN (iron deficiency anemia)     Inflamed seborrheic keratosis     Insomnia     Lumbar radiculopathy     Mitral regurgitation     Neovascular age-related macular degeneration     Nodular calcific aortic valve stenosis     Pseudophakia of both eyes     Renal scarring     S/P TAVR (transcatheter aortic valve replacement) 2023    Sciatica     Severe aortic stenosis     Transient cerebral ischemia      Past Surgical History:   Procedure Laterality Date    ANKLE GANGLION CYST EXCISION Left     CARDIAC CATHETERIZATION  2013    Dr Yadav    CATARACT EXTRACTION Bilateral      SECTION       SECTION      TRANSCATHETER AORTIC VALVE REPLACEMENT (TAVR)  2023    Dr Martínez  [place]     Family History   Problem Relation Age of Onset    Heart failure Mother     Parkinsonism Father     Valvular heart disease Sister         infant - cause of death     Social History     Tobacco Use    Smoking status: Never    Smokeless tobacco: Never   Substance Use Topics    Alcohol use: Never    Drug use: Never     Review of Systems   Constitutional: Negative.    HENT: Negative.     Respiratory:  Positive for shortness of breath.    Cardiovascular: Negative.    Endocrine: Negative.    Skin: Negative.    Neurological: Negative.    Psychiatric/Behavioral: Negative.     All other systems reviewed and are negative.      Physical Exam     Initial Vitals   BP Pulse Resp Temp SpO2   01/31/24 1120 01/31/24 1120 01/31/24 1124 01/31/24 1125 01/31/24 1120   (!) 132/109 (!) 122 (!) 25 97.5 °F (36.4 °C) (!) 87 %      MAP       --                Physical Exam    Nursing note and vitals reviewed.  Constitutional: She appears well-developed.   HENT:   Head: Normocephalic.   Eyes: Pupils are equal, round, and reactive to light.   Neck:   Normal range of motion.  Cardiovascular:  Regular rhythm and normal heart sounds.   Tachycardia present.         Patient has +2 swelling bilateral lower extremities.   Pulmonary/Chest: Accessory muscle usage present. She has decreased breath sounds in the right lower field and the left lower field.   Abdominal: Abdomen is soft.   Musculoskeletal:         General: Normal range of motion.      Cervical back: Normal range of motion.     Neurological: She is alert and oriented to person, place, and time.   Skin: Skin is warm and dry.   Psychiatric: She has a normal mood and affect.         ED Course   Critical Care    Date/Time: 1/31/2024 2:01 PM    Performed by: Esther Magana MD  Authorized by: Esther Magana MD  Direct patient critical care time: 20 minutes  Additional history critical care time: 10 minutes  Ordering / reviewing critical care time: 10  minutes  Documentation critical care time: 10 minutes  Consulting other physicians critical care time: 10 minutes  Consult with family critical care time: 10 minutes  Total critical care time (exclusive of procedural time) : 70 minutes  Critical care was necessary to treat or prevent imminent or life-threatening deterioration of the following conditions: cardiac failure.  Critical care was time spent personally by me on the following activities: examination of patient, obtaining history from patient or surrogate, ordering and performing treatments and interventions, ordering and review of laboratory studies, ordering and review of radiographic studies, pulse oximetry and re-evaluation of patient's condition.        Labs Reviewed   TROPONIN I - Normal   CK-MB - Normal   CK - Normal     EKG Readings: (Independently Interpreted)   Initial Reading: No STEMI. Rhythm: Atrial Fibrillation. Heart Rate: 107. Conduction: Normal. ST Segments: Non-Specific ST Segment Depression. Clinical Impression: Atrial Fibrillation with RVR   Other EKG Interpretations: Repeat EKG: atrial fibrillation at 73 bpm     ECG Results              EKG 12-lead (In process)  Result time 01/31/24 13:32:43      In process by Interface, Lab In [facility] (01/31/24 13:32:43)                   Narrative:    Test Reason : I48.91,    Vent. Rate : 073 BPM     Atrial Rate : 060 BPM     P-R Int : 000 ms          QRS Dur : 090 ms      QT Int : 386 ms       P-R-T Axes : 000 025 107 degrees     QTc Int : 425 ms    Atrial fibrillation  Low voltage QRS  Cannot rule out Anterior infarct (cited on or before 24-APR-2023)  Abnormal ECG  When compared with ECG of 31-JAN-2024 11:22,  No significant change was found    Referred By: COTY GUZMAN           Confirmed By:                                      EKG 12-lead (In process)  Result time 01/31/24 11:48:54      In process by Interface, Lab In [facility] (01/31/24 11:48:54)                   Narrative:    Test Reason :  R07.9,    Vent. Rate : 107 BPM     Atrial Rate : 078 BPM     P-R Int : 000 ms          QRS Dur : 092 ms      QT Int : 336 ms       P-R-T Axes : 000 038 105 degrees     QTc Int : 448 ms    Atrial fibrillation with rapid ventricular response with premature  ventricular or aberrantly conducted complexes  Possible Anterior infarct (cited on or before 24-APR-2023)  Abnormal ECG  When compared with ECG of 24-APR-2023 15:05,  Previous ECG has undetermined rhythm, needs review  Minimal criteria for Inferior infarct are no longer Present    Referred By: COTY GUZMAN           Confirmed By:                                   Imaging Results    None          Medications   furosemide injection 20 mg (20 mg Intravenous Given 1/31/24 1142)     Medical Decision Making  This patient is an 83-year-old female who has a past history of aortic valve replacement.  She has been swelling a lot over the past month.  Patient does have a history of CHF.  She went to see her PCP who ordered her for some lab work this morning.  The lab work basically came back normal except for a BNP of 777.  Patient's oxygen saturation has been 88-90 over the past 2 days.  And she is short of breath.  Differential diagnosis:  Congestive heart failure  Patient given 20 of Lasix through the IV here in the ER and she has diuresed 500 ml    Amount and/or Complexity of Data Reviewed  Labs: ordered.     Details: Cardiac enzymes were normal and patient's BNP was 777    Risk  Prescription drug management.                                      Clinical Impression:  Final diagnoses:  [R07.9] Chest pain  [I48.91] Atrial fibrillation  [R09.02] Hypoxia (Primary)  [I50.9] Congestive heart failure, unspecified HF chronicity, unspecified heart failure type          ED Disposition Condition    Transfer to Another Facility Stable                Esther Magana MD  01/31/24 1349       Esther Magana MD  01/31/24 1402       Esther Magana,  MD  01/31/24 1408       Esther Magana MD  01/31/24 1507

## 2024-01-31 NOTE — TELEPHONE ENCOUNTER
Patient came in today to see the provider and her granddaughter asked if I can get in touch with Dr Horn's office to move her appt up from April due to her symptoms and she was having trouble getting in touch with their office. I spoke with his  explaining her symptoms and she stated the nurse will have to over ride to get her on the schedule sooner but she did relay the message to his nurse. They feel she needs to be seen sooner and also move her echo up. They will contact the patient to schedule the appointment. I relayed the message to the patient and Sparkle. Per Sparkle she said to hold off at this time to move the appt up as they may be admitting her to the hospital.

## 2024-01-31 NOTE — PROGRESS NOTES
Patient ID: [id number]     Chief Complaint: Medicare AWV, Shortness of Breath (Short winded easily/Worsened over the last two months), Fatigue, Back Pain (Sleeping in recliner due to low back pain/Unable to sleep well due to symptoms/), and Foot Swelling      HPI:     Gwendolyn Trejo is a 83 y.o. female here today for a follow up. The patient's granddaughter is here today for the visit as well. The patient is a poor historian. The patient has been experiencing increasing shortness of breath and gets easily winded x 3 months. Along with SOB, she has BLE edema. She is unable to sleep in bed and has been sleeping in the recliner due to feeling she is able to breathe at night. The patient had a valve replaced by Dr. Brantley in 2023.     Past Medical History:   Diagnosis Date    Anemia of chronic disease     Anxiety disorder     Aortic stenosis     Atrial fibrillation     CAD (coronary artery disease)     Coronary arteriosclerosis     Crushing injury of right foot     Diastolic dysfunction     Diverticulosis     History of Coumadin therapy     History of transient ischemic attack (TIA)     HTN (hypertension)     JOAQUIN (iron deficiency anemia)     Inflamed seborrheic keratosis     Insomnia     Lumbar radiculopathy     Mitral regurgitation     Neovascular age-related macular degeneration     Nodular calcific aortic valve stenosis     Pseudophakia of both eyes     Renal scarring     S/P TAVR (transcatheter aortic valve replacement) 2023    Sciatica     Severe aortic stenosis     Transient cerebral ischemia         Past Surgical History:   Procedure Laterality Date    ANKLE GANGLION CYST EXCISION Left     CARDIAC CATHETERIZATION  2013    Dr Yadav    CATARACT EXTRACTION Bilateral      SECTION       SECTION      TRANSCATHETER AORTIC VALVE REPLACEMENT (TAVR)  2023    Dr Mauricio Horn        Social History     Socioeconomic History    Marital status:    Tobacco Use    Smoking  status: Never    Smokeless tobacco: Never   Substance and Sexual Activity    Alcohol use: Never    Drug use: Never    Sexual activity: Not Currently     Social Determinants of Health     Financial Resource Strain: Low Risk  (6/19/2023)    Overall Financial Resource Strain (CARDIA)     Difficulty of Paying Living Expenses: Not hard at all   Food Insecurity: No Food Insecurity (6/19/2023)    Hunger Vital Sign     Worried About Running Out of Food in the Last Year: Never true     Ran Out of Food in the Last Year: Never true   Transportation Needs: No Transportation Needs (6/19/2023)    PRAPARE - Transportation     Lack of Transportation (Medical): No     Lack of Transportation (Non-Medical): No   Physical Activity: Insufficiently Active (6/19/2023)    Exercise Vital Sign     Days of Exercise per Week: 5 days     Minutes of Exercise per Session: 20 min   Stress: Stress Concern Present (6/19/2023)    [place] citizen [place] [employer] Occupational [employer] - Occupational Stress Questionnaire     Feeling of Stress : Rather much   Social Connections: Moderately Integrated (6/19/2023)    Social Connection and Isolation Panel [NHANES]     Frequency of Communication with Friends and Family: More than three times a week     Frequency of Social Gatherings with Friends and Family: Three times a week     Attends Cheondoism Services: More than 4 times per year     Active Member of Clubs or Organizations: Yes     Attends Club or Organization Meetings: 1 to 4 times per year     Marital Status:    Housing Stability: Low Risk  (6/19/2023)    Housing Stability Vital Sign     Unable to Pay for Housing in the Last Year: No     Number of Places Lived in the Last Year: 1     Unstable Housing in the Last Year: No        Current Outpatient Medications   Medication Instructions    amLODIPine (NORVASC) 5 mg, Oral, Daily    clonazePAM (KLONOPIN) 0.5 MG tablet TAKE ONE TABLET BY MOUTH DAILY EVERY EVENING    ELIQUIS 5 mg, Oral, 2 times daily    furosemide  "(LASIX) 40 MG tablet TAKE ONE TABLET BY MOUTH EVERY MORNING    hydrALAZINE (APRESOLINE) 100 mg, Oral, Every 12 hours    losartan (COZAAR) 100 MG tablet TAKE ONE TABLET BY MOUTH DAILY FOR BLOOD PRESSURE    metoprolol succinate (TOPROL-XL) 25 mg, Oral, Nightly       Review of patient's allergies indicates:   Allergen Reactions    Penicillins Hives    Penicillin         Patient Care Team:  Estuardo Soriano DO as PCP - General (Family Medicine)  Salbador Scott Jr., MD as Consulting Physician (Ophthalmology)  Daniele Marie MD as Consulting Physician (Cardiology)  Jose Yadav MD as Consulting Physician (Vascular Surgery)  Mauricio Horn MD as Consulting Physician (Cardiology)  Steven Lozada - (Home Health Services)     Subjective:     Review of Systems    12 point review of systems conducted, negative except as stated in the history of present illness. See HPI for details.    Objective:     Visit Vitals  BP (!) 164/101 (BP Location: Right arm, Patient Position: Sitting, BP Method: Large (Automatic))   Pulse 86   Resp 20   Ht 5' 3" (1.6 m)   Wt 121.3 kg (267 lb 6.4 oz)   SpO2 (!) 90%   BMI 47.37 kg/m²       Physical Exam  Vitals and nursing note reviewed.   Constitutional:       General: She is not in acute distress.     Appearance: She is not ill-appearing.   HENT:      Head: Normocephalic and atraumatic.      Mouth/Throat:      Mouth: Mucous membranes are moist.      Pharynx: Oropharynx is clear.   Eyes:      General: No scleral icterus.     Extraocular Movements: Extraocular movements intact.      Conjunctiva/sclera: Conjunctivae normal.      Pupils: Pupils are equal, round, and reactive to light.   Neck:      Vascular: No carotid bruit.   Cardiovascular:      Rate and Rhythm: Normal rate. Rhythm regularly irregular.      Heart sounds: No murmur heard.     No friction rub. No gallop.   Pulmonary:      Effort: Respiratory distress (upon exertion) present.      Breath sounds: Examination " of the right-upper field reveals wheezing. Examination of the left-upper field reveals wheezing. Wheezing present. No rhonchi or rales.   Abdominal:      General: Abdomen is flat. Bowel sounds are normal. There is no distension.      Palpations: Abdomen is soft. There is no mass.      Tenderness: There is no abdominal tenderness.   Musculoskeletal:         General: Normal range of motion.      Cervical back: Normal range of motion and neck supple.      Right lower leg: 3+ Edema present.      Left lower leg: 3+ Edema present.      Comments: Patient is ambulating with cane.    Skin:     General: Skin is warm and dry.   Neurological:      General: No focal deficit present.      Mental Status: She is alert.   Psychiatric:         Mood and Affect: Mood normal.         Labs Reviewed:     Chemistry:  Lab Results   Component Value Date     01/31/2024    K 4.4 01/31/2024    CHLORIDE 102 01/31/2024    BUN 28.0 (H) 01/31/2024    CREATININE 0.92 01/31/2024    EGFRNORACEVR >60 01/31/2024    GLUCOSE 115 01/31/2024    CALCIUM 9.1 01/31/2024    ALKPHOS 60 01/31/2024    LABPROT 7.5 01/31/2024    ALBUMIN 4.1 01/31/2024    BILIDIR 0.4 12/22/2020    IBILI 0.60 12/22/2020    AST 19 01/31/2024    ALT 18 01/31/2024    MG 1.90 04/24/2023    [id number] 26.4 (L) 01/23/2024    TSH 1.958 01/23/2024      Hematology:  Lab Results   Component Value Date    WBC 7.13 01/31/2024    HGB 10.4 (L) 01/31/2024    HCT 35.3 (L) 01/31/2024     01/31/2024       Lipid Panel:  Lab Results   Component Value Date    CHOL 152 01/23/2024    HDL 59 01/23/2024    LDL 80.00 01/23/2024    TRIG 63 01/23/2024    TOTALCHOLEST 3 01/23/2024        Urine:  Lab Results   Component Value Date    COLORUA Yellow 01/23/2024    APPEARANCEUA Clear 01/23/2024    SGUA 1.015 01/23/2024    PHUA 6.5 01/23/2024    PROTEINUA Negative 01/23/2024    GLUCOSEUA Negative 01/23/2024    KETONESUA Negative 01/23/2024    BLOODUA Negative 01/23/2024    NITRITESUA Negative 01/23/2024     LEUKOCYTESUR Negative 01/23/2024    RBCUA 0-2 03/17/2023    WBCUA 3-5 03/17/2023    BACTERIA Rare 03/17/2023        Assessment:       ICD-10-CM ICD-9-CM   1. SOB (shortness of breath)  R06.02 786.05   2. Bilateral lower extremity edema  R60.0 782.3   3. Kidney disease  N28.9 593.9   4. Anemia of chronic disease  D63.8 285.29        Plan:     1. SOB (shortness of breath)  -     Cancel: BNP; Future; Expected date: 01/31/2024  -     Cancel: Troponin I; Future; Expected date: 01/31/2024  -     Cancel: X-Ray Chest PA And Lateral; Future; Expected date: 01/31/2024  -     BNP; Future; Expected date: 01/31/2024  -     Troponin I; Future; Expected date: 01/31/2024  -     X-Ray Chest PA And Lateral; Future; Expected date: 01/31/2024    2. Bilateral lower extremity edema  -     Cancel: BNP; Future; Expected date: 01/31/2024  -     Cancel: Troponin I; Future; Expected date: 01/31/2024  -     BNP; Future; Expected date: 01/31/2024  -     CBC Auto Differential; Future; Expected date: 01/31/2024  -     Comprehensive Metabolic Panel; Future; Expected date: 01/31/2024  -     Troponin I; Future; Expected date: 01/31/2024    3. Kidney disease  -     Cancel: Comprehensive Metabolic Panel; Future; Expected date: 01/31/2024  -     Comprehensive Metabolic Panel; Future; Expected date: 01/31/2024    4. Anemia of chronic disease  -     Cancel: CBC Auto Differential; Future; Expected date: 01/31/2024  -     CBC Auto Differential; Future; Expected date: 01/31/2024    After collaborating with Dr. Soriano, the patient is advised to receive further medical care at the ER. Given recent labs, CXR, and presenting symptoms, the patient needs to be carefully monitored while being diuresed due to her symptoms. The patient verbally agreed.     I spent a total of 76 minutes on the day of the visit.This includes face to face time and non-face to face time preparing to see the patient (eg, review of tests), obtaining and/or reviewing separately  obtained history, documenting clinical information in the electronic or other health record, independently interpreting results and communicating results to the patient/family/caregiver, or care coordinator.    Follow up in about 1 month (around 2/29/2024) for Medicare Wellness. In addition to their scheduled follow up, the patient has also been instructed to follow up on as needed basis.     Future Appointments   Date Time Provider Department Center   2/29/2024  9:00 AM Sparkle Espinosa NP [facility]      Sparkle Espinosa NP

## 2024-01-31 NOTE — PROGRESS NOTES
Patient ID: [id number]     Chief Complaint: Medicare Annual Wellness     HPI:     Gwendolyn Trejo is a 83 y.o. female here today for a Medicare Annual Wellness visit and comprehensive Health Risk Assessment.     A separate E/M code has been provided to evaluate additional complaints that the patient would like addressed during the dedicated Medicare Wellness Exam.    Health Maintenance   Topic Date Due    TETANUS VACCINE  Never done    DEXA Scan  Never done    Shingles Vaccine (1 of 2) Never done    Lipid Panel  2029        Past Medical History:   Diagnosis Date    Anemia of chronic disease     Anxiety disorder     Aortic stenosis     Atrial fibrillation     CAD (coronary artery disease)     Coronary arteriosclerosis     Crushing injury of right foot     Diastolic dysfunction     Diverticulosis     History of Coumadin therapy     History of transient ischemic attack (TIA)     HTN (hypertension)     JOAQUIN (iron deficiency anemia)     Inflamed seborrheic keratosis     Insomnia     Lumbar radiculopathy     Mitral regurgitation     Neovascular age-related macular degeneration     Nodular calcific aortic valve stenosis     Pseudophakia of both eyes     Renal scarring     S/P TAVR (transcatheter aortic valve replacement) 2023    Sciatica     Severe aortic stenosis     Transient cerebral ischemia         Past Surgical History:   Procedure Laterality Date    ANKLE GANGLION CYST EXCISION Left     CARDIAC CATHETERIZATION  2013    Dr Yadav    CATARACT EXTRACTION Bilateral      SECTION       SECTION      TRANSCATHETER AORTIC VALVE REPLACEMENT (TAVR)  2023    Dr Mauricio Horn        Social History     Socioeconomic History    Marital status:    Tobacco Use    Smoking status: Never    Smokeless tobacco: Never   Substance and Sexual Activity    Alcohol use: Never    Drug use: Never    Sexual activity: Not Currently     Social Determinants of Health     Financial Resource Strain: Low  Risk  (6/19/2023)    Overall Financial Resource Strain (CARDIA)     Difficulty of Paying Living Expenses: Not hard at all   Food Insecurity: No Food Insecurity (6/19/2023)    Hunger Vital Sign     Worried About Running Out of Food in the Last Year: Never true     Ran Out of Food in the Last Year: Never true   Transportation Needs: No Transportation Needs (6/19/2023)    PRAPARE - Transportation     Lack of Transportation (Medical): No     Lack of Transportation (Non-Medical): No   Physical Activity: Insufficiently Active (6/19/2023)    Exercise Vital Sign     Days of Exercise per Week: 5 days     Minutes of Exercise per Session: 20 min   Stress: Stress Concern Present (6/19/2023)    [place] [employer]ational [employer] - Occupational Stress Questionnaire     Feeling of Stress : Rather much   Social Connections: Moderately Integrated (6/19/2023)    Social Connection and Isolation Panel [NHANES]     Frequency of Communication with Friends and Family: More than three times a week     Frequency of Social Gatherings with Friends and Family: Three times a week     Attends Ep[employer]opalian Services: More than 4 times per year     Active Member of Clubs or Organizations: Yes     Attends Club or Organization Meetings: 1 to 4 times per year     Marital Status:    Housing Stability: Low Risk  (6/19/2023)    Housing Stability Vital Sign     Unable to Pay for Housing in the Last Year: No     Number of Places Lived in the Last Year: 1     Unstable Housing in the Last Year: No        Family History   Problem Relation Age of Onset    Heart failure Mother     Parkinsonism Father     Valvular heart disease Sister         infant - cause of death        Current Outpatient Medications   Medication Instructions    amLODIPine (NORVASC) 5 mg, Oral, Daily    clonazePAM (KLONOPIN) 0.5 MG tablet TAKE ONE TABLET BY MOUTH DAILY EVERY EVENING    ELIQUIS 5 mg, Oral, 2 times daily    furosemide (LASIX) 40 MG tablet TAKE ONE TABLET BY MOUTH  "EVERY MORNING    hydrALAZINE (APRESOLINE) 100 mg, Oral, Every 12 hours    losartan (COZAAR) 100 MG tablet TAKE ONE TABLET BY MOUTH DAILY FOR BLOOD PRESSURE    metoprolol succinate (TOPROL-XL) 25 mg, Oral, Nightly    SLOW RELEASE IRON 140 mg (45 mg iron) TbSR 1 tablet, Oral, Daily    traMADoL (ULTRAM) 50 mg, Oral, Every 8 hours PRN       Review of patient's allergies indicates:   Allergen Reactions    Penicillins Hives    Penicillin           There is no immunization history on file for this patient.     Patient Care Team:  Estuardo Soriano DO as PCP - General (Family Medicine)  Salbador Scott Jr., MD as Consulting Physician (Ophthalmology)  Daniele Marie MD as Consulting Physician (Cardiology)  Jose Yadav MD as Consulting Physician (Vascular Surgery)  Mauricio Horn MD as Consulting Physician (Cardiology)  Steven Lozada - (Home Health Services)    Subjective:     Review of Systems    12 point review of systems conducted, negative except as stated in the history of present illness. See HPI for details.    Objective:     There were no vitals taken for this visit.    Physical Exam    Labs Reviewed:     Chemistry:  Lab Results   Component Value Date     01/23/2024    K 3.9 01/23/2024    CHLORIDE 102 01/23/2024    BUN 25.0 (H) 01/23/2024    CREATININE 0.95 01/23/2024    EGFRNORACEVR 59 01/23/2024    GLUCOSE 126 (H) 01/23/2024    CALCIUM 9.0 01/23/2024    ALKPHOS 56 01/23/2024    LABPROT 7.1 01/23/2024    ALBUMIN 3.9 01/23/2024    BILIDIR 0.4 12/22/2020    IBILI 0.60 12/22/2020    AST 18 01/23/2024    ALT 15 01/23/2024    MG 1.90 04/24/2023    [id number] 26.4 (L) 01/23/2024    TSH 1.958 01/23/2024        No results found for: "HGBA1C", "MICROALBCREA"     Hematology:  Lab Results   Component Value Date    WBC 6.13 01/23/2024    HGB 10.6 (L) 01/23/2024    HCT 35.6 (L) 01/23/2024     01/23/2024       Lipid Panel:  Lab Results   Component Value Date    CHOL 152 01/23/2024    " HDL 59 01/23/2024    LDL 80.00 01/23/2024    TRIG 63 01/23/2024    TOTALCHOLEST 3 01/23/2024        Urine:  Lab Results   Component Value Date    COLORUA Yellow 01/23/2024    APPEARANCEUA Clear 01/23/2024    SGUA 1.015 01/23/2024    PHUA 6.5 01/23/2024    PROTEINUA Negative 01/23/2024    GLUCOSEUA Negative 01/23/2024    KETONESUA Negative 01/23/2024    BLOODUA Negative 01/23/2024    NITRITESUA Negative 01/23/2024    LEUKOCYTESUR Negative 01/23/2024    RBCUA 0-2 03/17/2023    WBCUA 3-5 03/17/2023    BACTERIA Rare 03/17/2023        Assessment:       ICD-10-CM ICD-9-CM   1. Wellness examination  Z00.00 V70.0        Plan:     1. Wellness examination         The following assessments were completed and reviewed. See completed screening forms and assessments within the Encounter Summary.  [x] Health Risk Assessment   [x] CVD Risk Factors - Reviewed  [x] Obesity/Physical Activity -  Encouraged daily 30 minute physical activity x 5 days per week.   [x] Home Safety/Living Situation  [x] CAGE  [x] Depression (PHQ) Screen  [x] Timed Get Up and Go  [x] Whisper Test  [x] Cognitive Function/Impairment Screen  [x] Nutrition Screening  [x] ADL Screen  [x] Opioid Screen:  [x] Patient does not have a prescription for opioids.   [] Patient has a prescription for opioids but is at low risk for abuse.   [x] Substance Abuse Screen:   [x] Patient does not use substances.   [x] Advanced Care Planning:  Advance Care Planning   Date: 01/31/2024  {[id number]}        Provided patient with a 5-10 year written screening schedule and personal prevention plan. Recommendations were developed using the USPSTF age appropriate recommendations. Education, counseling, and referrals were provided as needed. After Visit Summary printed and given to patient, which includes a list of additional screenings\tests needed.    No follow-ups on file. In addition to their scheduled follow up, the patient has also been instructed to follow up on as needed basis.      No future appointments.     Sparkle Espinosa NP

## 2024-01-31 NOTE — LETTER
AUTHORIZATION FOR RELEASE OF   CONFIDENTIAL INFORMATION    Dear Dr Marie,    We are seeing Gwendolyn Trejo, date of birth 1940, in the clinic at [facility], [place], operated by [facility]. KELLY Moreno is the patient's provider. Gwendolyn Trejo has an outstanding lab/procedure at the time we reviewed her chart. In order to help keep her health information updated, she has authorized us to request the following medical record(s):        (  )  MAMMOGRAM                                      (  )  COLONOSCOPY      (  )  PAP SMEAR                                          (  )  OUTSIDE LAB RESULTS     (  )  DEXA SCAN                                          (  )  EYE EXAM            (  )  FOOT EXAM                                          (  )  ENTIRE RECORD     (  )  OUTSIDE IMMUNIZATIONS                 (  x)  ___last office note____________         Please fax records to [facility], Ka'Ryn Franklin, AGNP-C, [phone]     If you have any questions, please contact [facility] at [phone]          Patient Name: Gwendolyn Trejo  : 1940  Patient Phone #: [phone]

## 2024-01-31 NOTE — ED NOTES
Pt sent from Dr Soriano office, pt to Ed with family, sts that pt was being seen for well visit follow up, c/o SOB, swelling. Pt SOB upon presentation, found to be 87% on RA, placed on 2LNC sats improve to 92%, pt aaox4.

## 2024-02-01 LAB
ANION GAP SERPL CALC-SCNC: 12 MEQ/L
BUN SERPL-MCNC: 22 MG/DL (ref 9.8–20.1)
CALCIUM SERPL-MCNC: 9.2 MG/DL (ref 8.4–10.2)
CHLORIDE SERPL-SCNC: 99 MMOL/L (ref 98–107)
CO2 SERPL-SCNC: 31 MMOL/L (ref 23–31)
CREAT SERPL-MCNC: 0.87 MG/DL (ref 0.55–1.02)
CREAT/UREA NIT SERPL: 25
ERYTHROCYTE [DISTWIDTH] IN BLOOD BY AUTOMATED COUNT: 15 % (ref 11.5–17)
FERRITIN SERPL-MCNC: 24.32 NG/ML (ref 4.63–204)
FOLATE SERPL-MCNC: 8.1 NG/ML (ref 7–31.4)
GFR SERPLBLD CREATININE-BSD FMLA CKD-EPI: >60 MLS/MIN/1.73/M2
GLUCOSE SERPL-MCNC: 107 MG/DL (ref 82–115)
HCT VFR BLD AUTO: 35.2 % (ref 37–47)
HGB BLD-MCNC: 10.7 G/DL (ref 12–16)
IRON SATN MFR SERPL: 10 % (ref 20–50)
IRON SERPL-MCNC: 38 UG/DL (ref 50–170)
MAGNESIUM SERPL-MCNC: 2.6 MG/DL (ref 1.6–2.6)
MCH RBC QN AUTO: 25.9 PG (ref 27–31)
MCHC RBC AUTO-ENTMCNC: 30.4 G/DL (ref 33–36)
MCV RBC AUTO: 85.2 FL (ref 80–94)
NRBC BLD AUTO-RTO: 0 %
PLATELET # BLD AUTO: 197 X10(3)/MCL (ref 130–400)
PMV BLD AUTO: 10.3 FL (ref 7.4–10.4)
POTASSIUM SERPL-SCNC: 3.8 MMOL/L (ref 3.5–5.1)
RBC # BLD AUTO: 4.13 X10(6)/MCL (ref 4.2–5.4)
SODIUM SERPL-SCNC: 142 MMOL/L (ref 136–145)
TIBC SERPL-MCNC: 325 UG/DL (ref 70–310)
TIBC SERPL-MCNC: 363 UG/DL (ref 250–450)
TRANSFERRIN SERPL-MCNC: 344 MG/DL
TROPONIN I SERPL-MCNC: 0.02 NG/ML (ref 0–0.04)
VIT B12 SERPL-MCNC: 761 PG/ML (ref 213–816)
WBC # SPEC AUTO: 7 X10(3)/MCL (ref 4.5–11.5)

## 2024-02-01 PROCEDURE — 84484 ASSAY OF TROPONIN QUANT: CPT | Performed by: NURSE PRACTITIONER

## 2024-02-01 PROCEDURE — 82607 VITAMIN B-12: CPT | Performed by: INTERNAL MEDICINE

## 2024-02-01 PROCEDURE — 25000003 PHARM REV CODE 250: Performed by: INTERNAL MEDICINE

## 2024-02-01 PROCEDURE — 80048 BASIC METABOLIC PNL TOTAL CA: CPT | Performed by: INTERNAL MEDICINE

## 2024-02-01 PROCEDURE — 11000001 HC ACUTE MED/SURG PRIVATE ROOM

## 2024-02-01 PROCEDURE — 27000221 HC OXYGEN, UP TO 24 HOURS

## 2024-02-01 PROCEDURE — 82746 ASSAY OF FOLIC ACID SERUM: CPT | Performed by: INTERNAL MEDICINE

## 2024-02-01 PROCEDURE — 82728 ASSAY OF FERRITIN: CPT | Performed by: INTERNAL MEDICINE

## 2024-02-01 PROCEDURE — 85027 COMPLETE CBC AUTOMATED: CPT | Performed by: INTERNAL MEDICINE

## 2024-02-01 PROCEDURE — 83540 ASSAY OF IRON: CPT | Performed by: INTERNAL MEDICINE

## 2024-02-01 PROCEDURE — 83735 ASSAY OF MAGNESIUM: CPT | Performed by: INTERNAL MEDICINE

## 2024-02-01 PROCEDURE — 63600175 PHARM REV CODE 636 W HCPCS: Performed by: INTERNAL MEDICINE

## 2024-02-01 RX ORDER — HYDRALAZINE HYDROCHLORIDE 50 MG/1
50 TABLET, FILM COATED ORAL 2 TIMES DAILY
Status: DISCONTINUED | OUTPATIENT
Start: 2024-02-01 | End: 2024-02-02 | Stop reason: HOSPADM

## 2024-02-01 RX ORDER — AMLODIPINE BESYLATE 5 MG/1
10 TABLET ORAL DAILY
Status: DISCONTINUED | OUTPATIENT
Start: 2024-02-01 | End: 2024-02-02 | Stop reason: HOSPADM

## 2024-02-01 RX ADMIN — HYDRALAZINE HYDROCHLORIDE 50 MG: 50 TABLET ORAL at 08:02

## 2024-02-01 RX ADMIN — CLONAZEPAM 0.5 MG: 0.5 TABLET ORAL at 08:02

## 2024-02-01 RX ADMIN — METOPROLOL SUCCINATE 50 MG: 50 TABLET, EXTENDED RELEASE ORAL at 08:02

## 2024-02-01 RX ADMIN — AMLODIPINE BESYLATE 10 MG: 5 TABLET ORAL at 09:02

## 2024-02-01 RX ADMIN — APIXABAN 5 MG: 5 TABLET, FILM COATED ORAL at 08:02

## 2024-02-01 RX ADMIN — FUROSEMIDE 80 MG: 10 INJECTION, SOLUTION INTRAVENOUS at 08:02

## 2024-02-01 RX ADMIN — FUROSEMIDE 80 MG: 10 INJECTION, SOLUTION INTRAVENOUS at 09:02

## 2024-02-01 RX ADMIN — LOSARTAN POTASSIUM 100 MG: 50 TABLET, FILM COATED ORAL at 09:02

## 2024-02-01 RX ADMIN — APIXABAN 5 MG: 5 TABLET, FILM COATED ORAL at 09:02

## 2024-02-01 RX ADMIN — HYDRALAZINE HYDROCHLORIDE 50 MG: 50 TABLET ORAL at 09:02

## 2024-02-01 NOTE — PROGRESS NOTES
[facility] Hospital Medicine Progress Note        Chief Complaint: Inpatient Follow-up     HPI:    83 year old female with a pmh of CAD, A fib on Eliquis, Aortic stenosis, HTN, TIA, JOAQUIN, insomnia who presented to [facility] with c/o SOB and fluid retention over the last month or so.  She was seen by her PCP today and was referred to the ED.  She states that her SOB has gotten to the point that she can hardly walk anywhere without becoming very SOB and is no longer able to lie flat and sleep in her bed, she has to sleep in the recliner.  She denies any chest pain, palpitations, fever, or cough. She states she takes her medications as prescribed.     ED vitals on arrival:  Temp 97.5° F, pulse 122, resp 25, /109, SpO2 87% on RA.  Today's ED lab work revealed H&H 10.4/35.3, .2, EKG showed AFib with RVR with premature ventricular or aberrantly conducted complexes.  Possible anterior infarct.  CXR showed cardiomegaly remains with increasing right hilar prominence. She was transferred to  [facility] and admitted to Hospital Medicine for management.       Interval Hx:  No acute changes overnight. Currently on 2L NC.  Daughter is at the bedside.  Reports has a history of valvular heart disease but he was not seen a cardiologist in the about a year.  Had an appointment upcoming in April.  Requesting that we get them involved.  She did have a good output with Lasix given in the emergency room.  Vitals are stable    Objective/physical exam:  General: In no acute distress, afebrile  Chest: Clear to auscultation bilaterally  Heart: RRR, +S1, S2, no appreciable murmur  Abdomen: Soft, nontender, BS +  MSK: Warm, no lower extremity edema, no clubbing or cyanosis  Neurologic: Alert and oriented x4, Cranial nerve II-XII intact, Strength 5/5 in all 4 extremities    VITAL SIGNS: 24 HRS MIN & MAX LAST   Temp  Min: 97.5 °F (36.4 °C)  Max: 98.2 °F (36.8 °C) 98.2 °F (36.8 °C)   BP  Min:  132/109  Max: 189/92 (!) 151/80   Pulse  Min: 75  Max: 122  75   Resp  Min: 14  Max: 42     SpO2  Min: 87 %  Max: 96 % 96 %       Recent Labs   Lab 01/31/24  0909 02/01/24  0409   WBC 7.13 7.00   RBC 4.07* 4.13*   HGB 10.4* 10.7*   HCT 35.3* 35.2*   MCV 86.7 85.2   MCH 25.6* 25.9*   MCHC 29.5* 30.4*   RDW 15.1 15.0    197   MPV 10.7* 10.3       Recent Labs   Lab 01/31/24  0909 02/01/24  0410    142   K 4.4 3.8   CO2 28 31   BUN 28.0* 22.0*   CREATININE 0.92 0.87   CALCIUM 9.1 9.2   MG  --  2.60   ALBUMIN 4.1  --    ALKPHOS 60  --    ALT 18  --    AST 19  --    BILITOT 1.2  --           Microbiology Results (last 7 days)       ** No results found for the last 168 hours. **             Radiology:  X-Ray Chest PA And Lateral  Narrative: EXAMINATION:  XR CHEST PA AND LATERAL    CLINICAL HISTORY:  Shortness of breath    TECHNIQUE:  Two views of the chest    COMPARISON:  No prior imaging available for comparison.    FINDINGS:  Cardiomegaly remains with increasing right hilar prominence.  This is likely projectional.  Recommend continued follow-up to exclude developing infectious process.  Impression: Cardiomegaly remains with increasing right hilar prominence. This is likely projectional.  Recommend continued follow-up to exclude developing infectious process.    Electronically signed by: Bhavik Buchanan  Date:    01/31/2024  Time:    09:23      Scheduled Med:   amLODIPine  5 mg Oral Daily    apixaban  5 mg Oral BID    clonazePAM  0.5 mg Oral QHS    furosemide (LASIX) injection  80 mg Intravenous Q12H    hydrALAZINE  25 mg Oral BID    losartan  100 mg Oral Daily    metoprolol succinate  50 mg Oral Nightly        Continuous Infusions:       PRN Meds:  acetaminophen, acetaminophen, aluminum-magnesium hydroxide-simethicone, cloNIDine, hydrALAZINE, melatonin, metoprolol, ondansetron, polyethylene glycol, prochlorperazine, senna-docusate 8.6-50 mg, sodium chloride 0.9%       Assessment/Plan:   Acute hypoxemic  respiratory failure secondary to below  New onset congestive heart failure  Atrial fibrillation with RVR  Accelerated blood pressure     History:  CAD, A fib on Eliquis, Aortic stenosis, HTN, TIA, JOAQUIN, insomnia    Continue IV Lasix.  Will monitor urine output.    Her cardiology team has been consulted.  She was past medical history of valvular placement and reports that she was on Eliquis.  This is also been continued.    Will continue with her home blood pressure regimen as well.    Wean O2 as tolerated.  Patient was unaware she had a history of heart failure but reports has not had an echocardiogram quite some time.  One has been ordered this morning.    Critical care diagnosis: acute systolic heart failure requiring IV diuretics  Critical care interventions: hands on evaluation, review of labs/radiographs/records and discussions with family  Critical care time spent: >32 minutes      Jason Szymanski MD   02/01/2024     All diagnosis and differential diagnosis have been reviewed; assessment and plan has been documented; I have personally reviewed the labs and test results that are presently available; I have reviewed the patients medication list; I have reviewed the consulting providers response and recommendations. I have reviewed or attempted to review medical records based upon their availability    All of the patient's questions have been  addressed and answered. Patient's is agreeable to the above stated plan. I will continue to monitor closely and make adjustments to medical management as needed.  _____________________________________________________________________

## 2024-02-01 NOTE — PLAN OF CARE
02/01/24 1030   Discharge Assessment   Assessment Type Discharge Planning Assessment   Confirmed/corrected address, phone number and insurance Yes   Confirmed Demographics Correct on Facesheet   Source of Information patient;family  (Dee Alanis (Daughter) [phone] (Mobile))   Communicated VILMA with patient/caregiver Date not available/Unable to determine   Reason For Admission 83-year-old female medical history of aortic stenosis status post TAVR, Chronic HFpEF, atrial fibrillation on Eliquis, hypertension presented [facility] with chief complaint of shortness of breath, dyspnea on exertion, abdominal distention and bilateral lower extremity edema.  On arrival noted to be in AFib with RVR, hypertensive, labs with elevated BNP and chest x-ray show cardiomegaly and interstitial opacities consistent with pulmonary edema on my review.  She was given IV Lasix2 0 , IV metoprolol and heart rate has improved.  And subsequently transferred to [facility] and referred to hospital medicine.   People in Home child(renzo), adult  (Dee Alanis (Daughter) [phone] (Mobile))   Facility Arrived From: Private residence.   Do you expect to return to your current living situation? Yes   Do you have help at home or someone to help you manage your care at home? Yes   Who are your caregiver(s) and their phone number(s)? Dee Alanis (Daughter) [phone] (Mobile)   Prior to hospitilization cognitive status: Alert/Oriented   Current cognitive status: Alert/Oriented   Walking or Climbing Stairs Difficulty yes   Walking or Climbing Stairs ambulation difficulty, requires equipment   Mobility Management The patient uses a rolling walker and quad cane for mobility concerns.   Dressing/Bathing Difficulty yes   Dressing/Bathing bathing difficulty, assistance 1 person   Dressing/Bathing Management Dee Alanis (Daughter) [phone] (Mobile) the patient's daughter assist with bathing/showering needs and ADLs PRN.   Home Accessibility    (There is a one to climb prior to entering her home.)   Home Layout Able to live on 1st floor   Equipment Currently Used at Home walker, rolling;grab bar;bath bench;cane, quad   Readmission within 30 days? No   Patient currently being followed by outpatient case management? No   Do you currently have service(s) that help you manage your care at home? No   Do you take prescription medications? Yes   Do you have prescription coverage? Yes   Coverage Payor: [employer] [facility]   Do you have any problems affording any of your prescribed medications? No   Is the patient taking medications as prescribed? yes   Who is going to help you get home at discharge? Dee lAanis (Daughter) [phone] (Mobile)   How do you get to doctors appointments? family or friend will provide   Are you on dialysis? No   Do you take coumadin? No   Discharge Plan A Home Health  (FOC: The patient and her daughter are requesting home health services through ([facility]) as she was receiving services in the past.)   Discharge Plan B Home Health   DME Needed Upon Discharge  none   Discharge Plan discussed with: Patient;Adult children  (Dee Alanis (Daughter) [phone] (Mobile))   Transition of Care Barriers None   Social Connections   In a typical week, how many times do you talk on the phone with family, friends, or neighbors? Three   How often do you get together with friends or relatives? Three times   How often do you attend Yazdanism or Denominational services? Never   Do you belong to any clubs or organizations such as Yazdanism groups, unions, fraternal or athletic groups, or school groups? No   How often do you attend meetings of the clubs or organizations you belong to? Never   Are you , , , , never , or living with a partner?    Alcohol Use   Q1: How often do you have a drink containing alcohol? Never   Q2: How many drinks containing alcohol do you have on a  typical day when you are drinking? None   Q3: How often do you have six or more drinks on one occasion? Never   OTHER   Name(s) of People in Home Dee Alanis (Daughter) [phone] (Mobile)

## 2024-02-01 NOTE — NURSING
Nurses Note -- 4 Eyes      1/31/2024   8:53 PM      Skin assessed during: Admit      [x] No Altered Skin Integrity Present    []Prevention Measures Documented      [] Yes- Altered Skin Integrity Present or Discovered   [] LDA Added if Not in Epic (Describe Wound)   [] New Altered Skin Integrity was Present on Admit and Documented in LDA   [] Wound Image Taken    Wound Care Consulted? No    Attending Nurse:  Brittany Lucas LPN     Second RN/Staff Member:   Alisha Huang RN

## 2024-02-01 NOTE — CONSULTS
Cardiovascular Consultation    Patient Name: Gwendolyn Trejo  Age: 83 y.o.  : 1940  MRN: [id number]  Admission Date: 2024  Primary Cardiologist: Frank   ?  Chief Complaint: shortness of breath      History of Present Illness:  Gwendolyn Trejo is a 83 y.o. female hx systolic heart failure, as s/p TAVR, HTN, HPL, chronic afib who presented to [facility] with SOB.  Has for some months having increased lower ext edema and gradual chun - not acute, but slow increase. Last couple weeks, orthopnea and pnd have also developed requiring her to sleep in recliner.  Not really compliant with diet - eating potato chips episodically        Review of Systems:  Review of Systems - 12 point review of systems was performed and reviewed with the patient and was negative except as indicated in the History of Present Illness.    Health Status  Review of patient's allergies indicates:   Allergen Reactions    Penicillins Hives    Penicillin        Past Medical History:   Diagnosis Date    Anemia of chronic disease     Anxiety disorder     Aortic stenosis     Atrial fibrillation     CAD (coronary artery disease)     Congestive heart failure (CHF)     Coronary arteriosclerosis     Crushing injury of right foot     Diastolic dysfunction     Diverticulosis     History of Coumadin therapy     History of transient ischemic attack (TIA)     HTN (hypertension)     Hypoxia     JOAQUIN (iron deficiency anemia)     Inflamed seborrheic keratosis     Insomnia     Lumbar radiculopathy     Mitral regurgitation     Neovascular age-related macular degeneration     Nodular calcific aortic valve stenosis     Pseudophakia of both eyes     Renal scarring     S/P TAVR (transcatheter aortic valve replacement) 2023    Sciatica     Severe aortic stenosis     Transient cerebral ischemia        Current Facility-Administered Medications   Medication Dose Route Frequency Provider Last Rate Last Admin    acetaminophen tablet 1,000 mg  1,000 mg Oral Q6H PRN Daryl,  Loreta LAW MD        acetaminophen tablet 650 mg  650 mg Oral Q4H PRN Daryl, Loreta LAW MD        aluminum-magnesium hydroxide-simethicone 200-200-20 mg/5 mL suspension 30 mL  30 mL Oral QID PRN Loreta Brito MD        amLODIPine tablet 10 mg  10 mg Oral Daily DarylLoreta MD   10 mg at 24 0931    apixaban tablet 5 mg  5 mg Oral BID DarylLoreta MD   5 mg at 24    clonazePAM tablet 0.5 mg  0.5 mg Oral QHS Daryl, Loreta LAW MD   0.5 mg at 24    cloNIDine tablet 0.2 mg  0.2 mg Oral TID PRN DarylLoreta moreira MD        furosemide injection 80 mg  80 mg Intravenous Q12H DarylLoreta MD   80 mg at 24    hydrALAZINE injection 20 mg  20 mg Intravenous Q4H PRN Loreta Brito MD        hydrALAZINE tablet 50 mg  50 mg Oral BID Daryl, Loreta LAW MD   50 mg at 24    losartan tablet 100 mg  100 mg Oral Daily DarylLoreta MD   100 mg at 24    melatonin tablet 6 mg  6 mg Oral Nightly PRN Loreta Brito MD        metoprolol injection 5 mg  5 mg Intravenous Q5 Min PRN DarylLoreta MD        metoprolol succinate (TOPROL-XL) 24 hr tablet 50 mg  50 mg Oral Nightly DarylLoreta MD   50 mg at 24    ondansetron injection 4 mg  4 mg Intravenous Q4H PRN DarylLoreta moreira MD        polyethylene glycol packet 17 g  17 g Oral BID PRN DarylLoreta MD        prochlorperazine injection Soln 5 mg  5 mg Intravenous Q6H PRN DarylLoreta moreira MD        senna-docusate 8.6-50 mg per tablet 2 tablet  2 tablet Oral BID PRN DarylLoreta moreira MD        sodium chloride 0.9% flush 10 mL  10 mL Intravenous PRN DarylLoreta moreira MD           Family History   Problem Relation Age of Onset    Heart failure Mother     Parkinsonism Father     Valvular heart disease Sister         infant - cause of death       Past Surgical History:   Procedure Laterality Date    ANKLE GANGLION CYST EXCISION Left     CARDIAC CATHETERIZATION  2013    Dr Yadav    CATARACT EXTRACTION Bilateral      SECTION       SECTION       TRANSCATHETER AORTIC VALVE REPLACEMENT (TAVR)  04/20/2023    Dr Mauricio Horn       Social History     Socioeconomic History    Marital status:    Tobacco Use    Smoking status: Never    Smokeless tobacco: Never   Substance and Sexual Activity    Alcohol use: Never    Drug use: Never    Sexual activity: Not Currently     Social Determinants of Health     Financial Resource Strain: Low Risk  (6/19/2023)    Overall Financial Resource Strain (CARDIA)     Difficulty of Paying Living Expenses: Not hard at all   Food Insecurity: No Food Insecurity (6/19/2023)    Hunger Vital Sign     Worried About Running Out of Food in the Last Year: Never true     Ran Out of Food in the Last Year: Never true   Transportation Needs: No Transportation Needs (6/19/2023)    PRAPARE - Transportation     Lack of Transportation (Medical): No     Lack of Transportation (Non-Medical): No   Physical Activity: Insufficiently Active (6/19/2023)    Exercise Vital Sign     Days of Exercise per Week: 5 days     Minutes of Exercise per Session: 20 min   Stress: Stress Concern Present (6/19/2023)    [employer] [place] [employer] - Occupational Stress Questionnaire     Feeling of Stress : Rather much   Social Connections: Socially Isolated (2/1/2024)    Social Connection and Isolation Panel [NHANES]     Frequency of Communication with Friends and Family: Three times a week     Frequency of Social Gatherings with Friends and Family: Three times a week     Attends Yarsani Services: Never     Active Member of Clubs or Organizations: No     Attends Club or Organization Meetings: Never     Marital Status:    Housing Stability: Low Risk  (6/19/2023)    Housing Stability Vital Sign     Unable to Pay for Housing in the Last Year: No     Number of Places Lived in the Last Year: 1     Unstable Housing in the Last Year: No       Physical Examination:  Vital signs:  Temp:  [97.9 °F (36.6 °C)-98.5 °F (36.9 °C)] 98.5 °F (36.9  °C)  Pulse:  [75-97] 86  Resp:  [14-41] 18  SpO2:  [90 %-96 %] 94 %  BP: (144-189)/() 163/77  Patient Vitals for the past 8 hrs:   BP Temp Temp src Pulse Resp SpO2   02/01/24 0909 (!) 163/77 -- -- -- -- --   02/01/24 0807 (!) 163/77 98.5 °F (36.9 °C) Oral 86 18 (!) 94 %        Recent Results (from the past 24 hour(s))   CBC Without Differential    Collection Time: 02/01/24  4:09 AM   Result Value Ref Range    WBC 7.00 4.50 - 11.50 x10(3)/mcL    RBC 4.13 (L) 4.20 - 5.40 x10(6)/mcL    Hgb 10.7 (L) 12.0 - 16.0 g/dL    Hct 35.2 (L) 37.0 - 47.0 %    MCV 85.2 80.0 - 94.0 fL    MCH 25.9 (L) 27.0 - 31.0 pg    MCHC 30.4 (L) 33.0 - 36.0 g/dL    RDW 15.0 11.5 - 17.0 %    Platelet 197 130 - 400 x10(3)/mcL    MPV 10.3 7.4 - 10.4 fL    NRBC% 0.0 %   Folate    Collection Time: 02/01/24  4:09 AM   Result Value Ref Range    Folate Level 8.1 7.0 - 31.4 ng/mL   Basic Metabolic Panel    Collection Time: 02/01/24  4:10 AM   Result Value Ref Range    Sodium Level 142 136 - 145 mmol/L    Potassium Level 3.8 3.5 - 5.1 mmol/L    Chloride 99 98 - 107 mmol/L    Carbon Dioxide 31 23 - 31 mmol/L    Glucose Level 107 82 - 115 mg/dL    Blood Urea Nitrogen 22.0 (H) 9.8 - 20.1 mg/dL    Creatinine 0.87 0.55 - 1.02 mg/dL    BUN/Creatinine Ratio 25     Calcium Level Total 9.2 8.4 - 10.2 mg/dL    Anion Gap 12.0 mEq/L    eGFR >60 mls/min/1.73/m2   Magnesium    Collection Time: 02/01/24  4:10 AM   Result Value Ref Range    Magnesium Level 2.60 1.60 - 2.60 mg/dL   Iron and TIBC    Collection Time: 02/01/24  4:10 AM   Result Value Ref Range    Iron Binding Capacity Unsaturated 325 (H) 70 - 310 ug/dL    Iron Level 38 (L) 50 - 170 ug/dL    Transferrin 344 mg/dL    Iron Binding Capacity Total 363 250 - 450 ug/dL    Iron Saturation 10 (L) 20 - 50 %   Ferritin    Collection Time: 02/01/24  4:10 AM   Result Value Ref Range    Ferritin Level 24.32 4.63 - 204.00 ng/mL   Vitamin B12    Collection Time: 02/01/24  4:10 AM   Result Value Ref Range    Vitamin B12  Level 761 213 - 816 pg/mL   Troponin I    Collection Time: 02/01/24  4:10 AM   Result Value Ref Range    Troponin-I 0.016 0.000 - 0.045 ng/mL   Echo    Collection Time: 02/01/24 10:15 AM   Result Value Ref Range    BSA 2.32 m2    Marie's Biplane MOD Ejection Fraction 37 %    LVOT stroke volume 64.04 cm3    LVIDd 4.60 3.5 - 6.0 cm    LV Systolic Volume 62.00 mL    LV Systolic Volume Index 28.3 mL/m2    LVIDs 3.80 2.1 - 4.0 cm    LV Diastolic Volume 97.30 mL    LV Diastolic Volume Index 44.43 mL/m2    IVS 1.50 (A) 0.6 - 1.1 cm    LVOT diameter 2.10 cm    LVOT area 3.5 cm2    FS 17 (A) 28 - 44 %    Left Ventricle Relative Wall Thickness 0.65 cm    Posterior Wall 1.50 (A) 0.6 - 1.1 cm    LV mass 284.84 g    LV Mass Index 130 g/m2    MV Peak E Kike 1.37 m/s    TDI LATERAL 0.16 m/s    TDI SEPTAL 0.09 m/s    E/E' ratio 10.96 m/s    MV Peak A Kike 0.38 m/s    TR Max Kike 2.63 m/s    E/A ratio 3.61     E wave deceleration time 143.00 msec    LV SEPTAL E/E' RATIO 15.22 m/s    LV LATERAL E/E' RATIO 8.56 m/s    LVOT peak kike 0.95 m/s    Left Ventricular Outflow Tract Mean Velocity 0.65 cm/s    Left Ventricular Outflow Tract Mean Gradient 3.00 mmHg    RVDD 3.58 cm    TAPSE 1.83 cm    LA size 4.90 cm    LA volume (mod) 138.00 cm3    LA Volume Index (Mod) 63.0 mL/m2    AV mean gradient 24 mmHg    AV peak gradient 45 mmHg    Ao peak kike 3.34 m/s    Ao VTI 61.00 cm    LVOT peak VTI 18.50 cm    AV valve area 1.05 cm²    AV Velocity Ratio 0.28     AV index (prosthetic) 0.30     KAREN by Velocity Ratio 0.98 cm²    MV mean gradient 3 mmHg    MV peak gradient 9 mmHg    MV valve area by continuity eq 2.06 cm2    MV VTI 31.1 cm    Triscuspid Valve Regurgitation Peak Gradient 28 mmHg    PV PEAK VELOCITY 1.03 m/s    PV peak gradient 4 mmHg    Mean e' 0.13 m/s    ZLVIDS -1.33     ZLVIDD -4.75      [unfilled]  Wt Readings from Last 3 Encounters:   01/31/24 121.1 kg (267 lb)   01/31/24 121.3 kg (267 lb 6.4 oz)   06/19/23 112.4 kg (247 lb 12.8  oz)         Physical Exam   Constitutional: Oriented to person, place, and time and well-developed, well-nourished, and in no distress.   Eyes: Conjunctivae are normal. Pupils are equal, round   Neck: Neck supple.   Cardiovascular: Normal rate, irregular rhythm and soft basilar MARVIN.   Pulmonary/Chest: Effort normal and breath sounds decreased  no gross crackles  Abdominal: Soft. Bowel sounds are normal. There is no tenderness.   . Neurological: Alert and oriented to person, place, and time.    Skin: Skin is warm and dry.   Psychiatric: Affect normal.     Labs reviewed  Echo / cxr reports reviewed      Assessment/Plan:  Acute on chronic systolic heart failure  S/po TAVR  Chronic Afib   HTN  HPL  - continue diuresis.  Responding well with resolution most of symptoms  - long discussion about import diet compliance  - likely increase lasix 40 post discharge  - monitor lytes  - rate control looks good.    - continue anticoagulation  - I will plan outpt holter to evaluate rate control at home to ensure it is adequate.    - she is anxious to go home but is agreeable to waiting until tomorrow to see how volume is doing              CRISTINA Kramer, FNP-C  [facility]

## 2024-02-02 ENCOUNTER — HOSPITAL ENCOUNTER (OUTPATIENT)
Dept: CARDIOLOGY | Facility: HOSPITAL | Age: 84
Discharge: HOME | End: 2024-02-02
Attending: FAMILY MEDICINE | Admitting: FAMILY MEDICINE
Payer: MEDICARE

## 2024-02-02 VITALS
HEART RATE: 76 BPM | BODY MASS INDEX: 47.3 KG/M2 | WEIGHT: 267 LBS | SYSTOLIC BLOOD PRESSURE: 135 MMHG | DIASTOLIC BLOOD PRESSURE: 70 MMHG | RESPIRATION RATE: 19 BRPM | TEMPERATURE: 98 F | OXYGEN SATURATION: 91 %

## 2024-02-02 DIAGNOSIS — I48.0 PAROXYSMAL ATRIAL FIBRILLATION: ICD-10-CM

## 2024-02-02 DIAGNOSIS — I48.0 PAROXYSMAL ATRIAL FIBRILLATION: Primary | ICD-10-CM

## 2024-02-02 LAB
ANION GAP SERPL CALC-SCNC: 13 MEQ/L
AV INDEX (PROSTH): 0.3
AV MEAN GRADIENT: 24 MMHG
AV PEAK GRADIENT: 45 MMHG
AV VALVE AREA BY VELOCITY RATIO: 0.98 CM²
AV VALVE AREA: 1.05 CM²
AV VELOCITY RATIO: 0.28
BASOPHILS # BLD AUTO: 0.07 X10(3)/MCL
BASOPHILS NFR BLD AUTO: 0.9 %
BSA FOR ECHO PROCEDURE: 2.32 M2
BUN SERPL-MCNC: 27 MG/DL (ref 9.8–20.1)
CALCIUM SERPL-MCNC: 9 MG/DL (ref 8.4–10.2)
CHLORIDE SERPL-SCNC: 96 MMOL/L (ref 98–107)
CO2 SERPL-SCNC: 29 MMOL/L (ref 23–31)
CREAT SERPL-MCNC: 1.06 MG/DL (ref 0.55–1.02)
CREAT/UREA NIT SERPL: 25
CV ECHO LV RWT: 0.65 CM
DOP CALC AO PEAK VEL: 3.34 M/S
DOP CALC AO VTI: 61 CM
DOP CALC LVOT AREA: 3.5 CM2
DOP CALC LVOT DIAMETER: 2.1 CM
DOP CALC LVOT PEAK VEL: 0.95 M/S
DOP CALC LVOT STROKE VOLUME: 64.04 CM3
DOP CALC MV VTI: 31.1 CM
DOP CALCLVOT PEAK VEL VTI: 18.5 CM
E WAVE DECELERATION TIME: 143 MSEC
E/A RATIO: 3.61
E/E' RATIO: 10.96 M/S
ECHO LV POSTERIOR WALL: 1.5 CM (ref 0.6–1.1)
EOSINOPHIL # BLD AUTO: 0.22 X10(3)/MCL (ref 0–0.9)
EOSINOPHIL NFR BLD AUTO: 2.9 %
ERYTHROCYTE [DISTWIDTH] IN BLOOD BY AUTOMATED COUNT: 14.9 % (ref 11.5–17)
FRACTIONAL SHORTENING: 17 % (ref 28–44)
GFR SERPLBLD CREATININE-BSD FMLA CKD-EPI: 52 MLS/MIN/1.73/M2
GLUCOSE SERPL-MCNC: 111 MG/DL (ref 82–115)
HCT VFR BLD AUTO: 37.4 % (ref 37–47)
HGB BLD-MCNC: 11.2 G/DL (ref 12–16)
IMM GRANULOCYTES # BLD AUTO: 0.03 X10(3)/MCL (ref 0–0.04)
IMM GRANULOCYTES NFR BLD AUTO: 0.4 %
INTERVENTRICULAR SEPTUM: 1.5 CM (ref 0.6–1.1)
LEFT ATRIUM SIZE: 4.9 CM
LEFT ATRIUM VOLUME INDEX MOD: 63 ML/M2
LEFT ATRIUM VOLUME MOD: 138 CM3
LEFT INTERNAL DIMENSION IN SYSTOLE: 3.8 CM (ref 2.1–4)
LEFT VENTRICLE DIASTOLIC VOLUME INDEX: 44.43 ML/M2
LEFT VENTRICLE DIASTOLIC VOLUME: 97.3 ML
LEFT VENTRICLE MASS INDEX: 130 G/M2
LEFT VENTRICLE SYSTOLIC VOLUME INDEX: 28.3 ML/M2
LEFT VENTRICLE SYSTOLIC VOLUME: 62 ML
LEFT VENTRICULAR INTERNAL DIMENSION IN DIASTOLE: 4.6 CM (ref 3.5–6)
LEFT VENTRICULAR MASS: 284.84 G
LV LATERAL E/E' RATIO: 8.56 M/S
LV SEPTAL E/E' RATIO: 15.22 M/S
LVOT MG: 3 MMHG
LVOT MV: 0.65 CM/S
LYMPHOCYTES # BLD AUTO: 0.88 X10(3)/MCL (ref 0.6–4.6)
LYMPHOCYTES NFR BLD AUTO: 11.7 %
MCH RBC QN AUTO: 25.9 PG (ref 27–31)
MCHC RBC AUTO-ENTMCNC: 29.9 G/DL (ref 33–36)
MCV RBC AUTO: 86.6 FL (ref 80–94)
MONOCYTES # BLD AUTO: 0.84 X10(3)/MCL (ref 0.1–1.3)
MONOCYTES NFR BLD AUTO: 11.2 %
MV MEAN GRADIENT: 3 MMHG
MV PEAK A VEL: 0.38 M/S
MV PEAK E VEL: 1.37 M/S
MV PEAK GRADIENT: 9 MMHG
MV VALVE AREA BY CONTINUITY EQUATION: 2.06 CM2
NEUTROPHILS # BLD AUTO: 5.49 X10(3)/MCL (ref 2.1–9.2)
NEUTROPHILS NFR BLD AUTO: 72.9 %
NRBC BLD AUTO-RTO: 0 %
OHS LV EJECTION FRACTION SIMPSONS BIPLANE MOD: 37 %
PISA TR MAX VEL: 2.63 M/S
PLATELET # BLD AUTO: 211 X10(3)/MCL (ref 130–400)
PMV BLD AUTO: 10.5 FL (ref 7.4–10.4)
POTASSIUM SERPL-SCNC: 3.9 MMOL/L (ref 3.5–5.1)
PV PEAK GRADIENT: 4 MMHG
PV PEAK VELOCITY: 1.03 M/S
RBC # BLD AUTO: 4.32 X10(6)/MCL (ref 4.2–5.4)
RIGHT VENTRICULAR END-DIASTOLIC DIMENSION: 3.58 CM
SODIUM SERPL-SCNC: 138 MMOL/L (ref 136–145)
TDI LATERAL: 0.16 M/S
TDI SEPTAL: 0.09 M/S
TDI: 0.13 M/S
TR MAX PG: 28 MMHG
TRICUSPID ANNULAR PLANE SYSTOLIC EXCURSION: 1.83 CM
WBC # SPEC AUTO: 7.53 X10(3)/MCL (ref 4.5–11.5)
Z-SCORE OF LEFT VENTRICULAR DIMENSION IN END DIASTOLE: -4.75
Z-SCORE OF LEFT VENTRICULAR DIMENSION IN END SYSTOLE: -1.33

## 2024-02-02 PROCEDURE — 63600175 PHARM REV CODE 636 W HCPCS: Performed by: INTERNAL MEDICINE

## 2024-02-02 PROCEDURE — 25000003 PHARM REV CODE 250: Performed by: INTERNAL MEDICINE

## 2024-02-02 PROCEDURE — 80048 BASIC METABOLIC PNL TOTAL CA: CPT | Performed by: HOSPITALIST

## 2024-02-02 PROCEDURE — 85025 COMPLETE CBC W/AUTO DIFF WBC: CPT | Performed by: HOSPITALIST

## 2024-02-02 PROCEDURE — 93226 XTRNL ECG REC<48 HR SCAN A/R: CPT

## 2024-02-02 PROCEDURE — 27000221 HC OXYGEN, UP TO 24 HOURS

## 2024-02-02 PROCEDURE — 94761 N-INVAS EAR/PLS OXIMETRY MLT: CPT

## 2024-02-02 RX ORDER — FUROSEMIDE 40 MG/1
40 TABLET ORAL 2 TIMES DAILY
Qty: 90 TABLET | Refills: 1 | Status: SHIPPED | OUTPATIENT
Start: 2024-02-02 | End: 2024-05-14 | Stop reason: SDUPTHER

## 2024-02-02 RX ADMIN — HYDRALAZINE HYDROCHLORIDE 50 MG: 50 TABLET ORAL at 08:02

## 2024-02-02 RX ADMIN — AMLODIPINE BESYLATE 10 MG: 5 TABLET ORAL at 08:02

## 2024-02-02 RX ADMIN — LOSARTAN POTASSIUM 100 MG: 50 TABLET, FILM COATED ORAL at 08:02

## 2024-02-02 RX ADMIN — Medication 6 MG: at 01:02

## 2024-02-02 RX ADMIN — APIXABAN 5 MG: 5 TABLET, FILM COATED ORAL at 08:02

## 2024-02-02 RX ADMIN — FUROSEMIDE 80 MG: 10 INJECTION, SOLUTION INTRAVENOUS at 08:02

## 2024-02-02 NOTE — PLAN OF CARE
02/02/24 0822   Discharge Reassessment   Assessment Type Discharge Planning Reassessment   Did the patient's condition or plan change since previous assessment? No   Discharge Plan discussed with: Patient  (Dee Alanis (Daughter) [phone] (Mobile))   Communicated VILMA with patient/caregiver Yes   Discharge Plan A Home Health  (FOC: The patient will be discharged home today from [facility] to her private residence with home health services through ([facility]).)   Discharge Plan B Home Health   DME Needed Upon Discharge  none   Transition of Care Barriers None   Why the patient remains in the hospital Requires continued medical care   Post-Acute Status   Post-Acute Authorization Home Health   Home Health Status Set-up Complete/Auth obtained   Coverage Payor: [employer] [facility] Resources/Appts/Education Provided Appointments scheduled and added to AVS   Patient choice form signed by patient/caregiver List with quality metrics by geographic area provided   Discharge Delays None known at this time     The patient and her daughter were informed of discharge plans scheduled for today. She will receive home health services through ([facility]) upon discharge Clinical notes/updates and AVS and D/C Summary were uploaded and sent through [facility] for review. Transportation will be provided by her daughter's (Dee Alanis) private vehicle. The patient and her daughter (Dee) are in agreement of the above discharge plans.   Mauc Instructions: By selecting yes to the question below the MAUC number will be added into the note.  This will be calculated automatically based on the diagnosis chosen, the size entered, the body zone selected (H,M,L) and the specific indications you chose. You will also have the option to override the Mohs AUC if you disagree with the automatically calculated number and this option is found in the Case Summary tab.

## 2024-02-02 NOTE — PROGRESS NOTES
Cardiology Daily Progress Note    Patient Name: Gwendolyn Trejo  Age: 83 y.o.  : 1940  MRN: [id number]  Admission Date: 2024      Subjective: No acute cardiac events overnight.  She feels well and is ready for discharge home.  Note that discharge orders have been placed in computer.      Review of Systems   General ROS: negative.  Respiratory ROS: no cough, shortness of breath, or wheezing.  Cardiovascular ROS: no chest pain or dyspnea on exertion.  Gastrointestinal ROS: no abdominal pain, change in bowel habits, or black or bloody stools.  Genito-Urinary ROS: no dysuria, trouble voiding, or hematuria.  Musculoskeletal ROS: negative.  Neurological ROS: negative.      Health Status:  Review of patient's allergies indicates:   Allergen Reactions    Penicillins Hives    Penicillin        Current Facility-Administered Medications   Medication Dose Route Frequency Provider Last Rate Last Admin    acetaminophen tablet 1,000 mg  1,000 mg Oral Q6H PRN Loreta Brito MD        acetaminophen tablet 650 mg  650 mg Oral Q4H PRN Loreta Brito MD        aluminum-magnesium hydroxide-simethicone 200-200-20 mg/5 mL suspension 30 mL  30 mL Oral QID PRN Loreta Brito MD        amLODIPine tablet 10 mg  10 mg Oral Daily Loreta Brito MD   10 mg at 2431    apixaban tablet 5 mg  5 mg Oral BID Loreta Brito MD   5 mg at 24    clonazePAM tablet 0.5 mg  0.5 mg Oral QHS Loreta Brito MD   0.5 mg at 24    cloNIDine tablet 0.2 mg  0.2 mg Oral TID PRN Loreta Brito MD        furosemide injection 80 mg  80 mg Intravenous Q12H Loreta Brito MD   80 mg at 24    hydrALAZINE injection 20 mg  20 mg Intravenous Q4H PRN Loreta Brito MD        hydrALAZINE tablet 50 mg  50 mg Oral BID Loreta Brito MD   50 mg at 24    losartan tablet 100 mg  100 mg Oral Daily Loreta Brito MD   100 mg at 24 0909    melatonin tablet 6 mg  6 mg Oral Nightly PRN Loreta Brito MD   6 mg at 24 0112     metoprolol injection 5 mg  5 mg Intravenous Q5 Min PRN Loreta Brito MD        metoprolol succinate (TOPROL-XL) 24 hr tablet 50 mg  50 mg Oral Nightly Loreta Brito MD   50 mg at 02/01/24 2035    ondansetron injection 4 mg  4 mg Intravenous Q4H PRN Loreta Brito MD        polyethylene glycol packet 17 g  17 g Oral BID PRN Loreta Brito MD        prochlorperazine injection Soln 5 mg  5 mg Intravenous Q6H PRN Loreta Brito MD        senna-docusate 8.6-50 mg per tablet 2 tablet  2 tablet Oral BID PRN Loreta Brito MD        sodium chloride 0.9% flush 10 mL  10 mL Intravenous PRN Loreta Brito MD           Objective:  Patient Vitals for the past 24 hrs:   BP Temp Temp src Pulse Resp SpO2   02/02/24 0535 132/71 97.5 °F (36.4 °C) Oral 77 -- (!) 85 %   02/01/24 2356 130/77 97.7 °F (36.5 °C) Oral 78 -- (!) 91 %   02/01/24 2035 139/79 97.7 °F (36.5 °C) Oral 78 -- (!) 91 %   02/01/24 2035 139/79 -- -- -- -- --   02/01/24 1544 127/73 97.8 °F (36.6 °C) Oral 76 -- 96 %   02/01/24 1220 131/74 98.1 °F (36.7 °C) Oral 89 -- 95 %   02/01/24 0909 (!) 163/77 -- -- -- -- --   02/01/24 0807 (!) 163/77 98.5 °F (36.9 °C) Oral 86 18 (!) 94 %     Recent Results (from the past 24 hour(s))   Echo    Collection Time: 02/01/24 10:15 AM   Result Value Ref Range    BSA 2.32 m2    Marie's Biplane MOD Ejection Fraction 37 %    LVOT stroke volume 64.04 cm3    LVIDd 4.60 3.5 - 6.0 cm    LV Systolic Volume 62.00 mL    LV Systolic Volume Index 28.3 mL/m2    LVIDs 3.80 2.1 - 4.0 cm    LV Diastolic Volume 97.30 mL    LV Diastolic Volume Index 44.43 mL/m2    IVS 1.50 (A) 0.6 - 1.1 cm    LVOT diameter 2.10 cm    LVOT area 3.5 cm2    FS 17 (A) 28 - 44 %    Left Ventricle Relative Wall Thickness 0.65 cm    Posterior Wall 1.50 (A) 0.6 - 1.1 cm    LV mass 284.84 g    LV Mass Index 130 g/m2    MV Peak E Kike 1.37 m/s    TDI LATERAL 0.16 m/s    TDI SEPTAL 0.09 m/s    E/E' ratio 10.96 m/s    MV Peak A Kike 0.38 m/s    TR Max Kike 2.63 m/s    E/A ratio 3.61     E  wave deceleration time 143.00 msec    LV SEPTAL E/E' RATIO 15.22 m/s    LV LATERAL E/E' RATIO 8.56 m/s    LVOT peak chip 0.95 m/s    Left Ventricular Outflow Tract Mean Velocity 0.65 cm/s    Left Ventricular Outflow Tract Mean Gradient 3.00 mmHg    RVDD 3.58 cm    TAPSE 1.83 cm    LA size 4.90 cm    LA volume (mod) 138.00 cm3    LA Volume Index (Mod) 63.0 mL/m2    AV mean gradient 24 mmHg    AV peak gradient 45 mmHg    Ao peak chip 3.34 m/s    Ao VTI 61.00 cm    LVOT peak VTI 18.50 cm    AV valve area 1.05 cm²    AV Velocity Ratio 0.28     AV index (prosthetic) 0.30     KAREN by Velocity Ratio 0.98 cm²    MV mean gradient 3 mmHg    MV peak gradient 9 mmHg    MV valve area by continuity eq 2.06 cm2    MV VTI 31.1 cm    Triscuspid Valve Regurgitation Peak Gradient 28 mmHg    PV PEAK VELOCITY 1.03 m/s    PV peak gradient 4 mmHg    Mean e' 0.13 m/s    ZLVIDS -1.33     ZLVIDD -4.75    Basic Metabolic Panel    Collection Time: 02/02/24  4:11 AM   Result Value Ref Range    Sodium Level 138 136 - 145 mmol/L    Potassium Level 3.9 3.5 - 5.1 mmol/L    Chloride 96 (L) 98 - 107 mmol/L    Carbon Dioxide 29 23 - 31 mmol/L    Glucose Level 111 82 - 115 mg/dL    Blood Urea Nitrogen 27.0 (H) 9.8 - 20.1 mg/dL    Creatinine 1.06 (H) 0.55 - 1.02 mg/dL    BUN/Creatinine Ratio 25     Calcium Level Total 9.0 8.4 - 10.2 mg/dL    Anion Gap 13.0 mEq/L    eGFR 52 mls/min/1.73/m2   CBC with Differential    Collection Time: 02/02/24  4:11 AM   Result Value Ref Range    WBC 7.53 4.50 - 11.50 x10(3)/mcL    RBC 4.32 4.20 - 5.40 x10(6)/mcL    Hgb 11.2 (L) 12.0 - 16.0 g/dL    Hct 37.4 37.0 - 47.0 %    MCV 86.6 80.0 - 94.0 fL    MCH 25.9 (L) 27.0 - 31.0 pg    MCHC 29.9 (L) 33.0 - 36.0 g/dL    RDW 14.9 11.5 - 17.0 %    Platelet 211 130 - 400 x10(3)/mcL    MPV 10.5 (H) 7.4 - 10.4 fL    Neut % 72.9 %    Lymph % 11.7 %    Mono % 11.2 %    Eos % 2.9 %    Basophil % 0.9 %    Lymph # 0.88 0.6 - 4.6 x10(3)/mcL    Neut # 5.49 2.1 - 9.2 x10(3)/mcL    Mono # 0.84  0.1 - 1.3 x10(3)/mcL    Eos # 0.22 0 - 0.9 x10(3)/mcL    Baso # 0.07 <=0.2 x10(3)/mcL    IG# 0.03 0 - 0.04 x10(3)/mcL    IG% 0.4 %    NRBC% 0.0 %     [unfilled]  Wt Readings from Last 3 Encounters:   02/01/24 121.1 kg (267 lb)   01/31/24 121.1 kg (267 lb)   01/31/24 121.3 kg (267 lb 6.4 oz)       Physical Exam:  General: Alert and oriented, no acute distress.  Neck: No carotid bruit, no jugular venous distention.  Respiratory: Breath sounds are equal, symmetrical chest wall expansion. Breath sounds are clear.  Cardiovascular: Normal rate, irregular rhythm. No murmur. No gallop. No edema noted. Patient is AFib on tele.  Integumentary: Clean, warm, dry, and intact.  Neurologic: Alert and oriented.   Psychiatric: Cooperative, appropriate mood and affect.        Assessment/Plan:    Acute on chronic systolic heart failure  -echo is currently pending MD review  -plan to discharge patient home on Lasix 40 mg p.o. daily   -again discussed importance of compliance with low-sodium diet  -patient and family members state understanding    S/p TAVR  -outpatient monitoring    Chronic Afib   -plan for 7 week Holter monitor on discharge to ensure adequate rate control   -continue current medical therapy for now  -patient to monitor BP and heart rate at home as well    HTN  -stable    HLD  -stable      * patient of Dr. Marie in [facility].  Patient is cleared to be discharged home from Cardiology standpoint.  Informed patient and her family that I will discuss with Dr. Marie's nurse re:  Setting up outpatient Holter monitor potentially at [facility]. She will need outpatient follow up after monitor completed.           SEBASTIEN Millard-C  [facility]

## 2024-02-02 NOTE — DISCHARGE SUMMARY
[facility]  Hospital Medicine Discharge Summary    Admit Date: 1/31/2024  Discharge Date and Time: 2/2/20246:41 AM  Admitting Physician: Hospitalist team   Discharging Physician: Jason Szymanski MD.  Primary Care Physician: Estuardo Soriano DO      Discharge Diagnoses:  Acute hypoxemic respiratory failure secondary to below  New onset congestive heart failure  Atrial fibrillation with RVR  Accelerated blood pressure     History:  CAD, A fib on Eliquis, Aortic stenosis, HTN, TIA, JOAQUIN, insomnia    Hospital Course:   83 year old female with a pmh of CAD, A fib on Eliquis, Aortic stenosis, HTN, TIA, JOAQUIN, insomnia who presented to [facility] ED with c/o SOB and fluid retention over the last month or so.  She was seen by her PCP today and was referred to the ED.  She states that her SOB has gotten to the point that she can hardly walk anywhere without becoming very SOB and is no longer able to lie flat and sleep in her bed, she has to sleep in the recliner.  She denies any chest pain, palpitations, fever, or cough. She states she takes her medications as prescribed.     ED vitals on arrival:  Temp 97.5° F, pulse 122, resp 25, /109, SpO2 87% on RA.  Today's ED lab work revealed H&H 10.4/35.3, .2, EKG showed AFib with RVR with premature ventricular or aberrantly conducted complexes.  Possible anterior infarct.  CXR showed cardiomegaly remains with increasing right hilar prominence. She was transferred to  [facility] and admitted to Hospital Medicine for management.  She was started on IV Lasix 80 mg b.I.d..  She had almost 4 L of urine output in the 1st 24 hours and symptomatically greatly improved.  Her treating cardiologist evaluated her in the hospital.  They recommended continuing with diuretics.  She was on Lasix at home but will increase this to 40 mg b.I.d..  Cardiology also recommending Holter monitor which will be set as an outpatient.  She was back at her baseline  though she does still have some peripheral edema.  We discussed a low-sodium diet.  Patient was daughters at the bedside as well.  The plan to follow up with Cardiology next week       Vitals:  Blood pressure 132/71, pulse 77, temperature 97.5 °F (36.4 °C), temperature source Oral, resp. rate 18, weight 121.1 kg (267 lb), SpO2 (!) 85 %..    Physical Exam:  Awake, Alert, Oriented x 3, No new focal Neurologic deficit, Normal Affect  NC/AT, PERRLA, EOMI  Supple neck, no JVD, No cervical lymphadenopathy  Symmetrical chest, Good air entry bilaterally. No rhonchi, wheezes, crackles appreciated  RRR, No gallop, rub or murmur  +ve Bowel sounds x4, Abd soft and non tender, no rebound, guarding or rigidity  No Cyanosis, cludding or edema, No new rash or bruises    Procedures Performed: No admission procedures for hospital encounter.     Significant Diagnostic Studies: See Full reports for all details  Admission on 01/31/2024   Component Date Value    BSA 02/01/2024 2.32     Marie's Biplane MOD Ej* 02/01/2024 37     LVOT stroke volume 02/01/2024 64.04     LVIDd 02/01/2024 4.60     LV Systolic Volume 02/01/2024 62.00     LV Systolic Volume Index 02/01/2024 28.3     LVIDs 02/01/2024 3.80     LV Diastolic Volume 02/01/2024 97.30     LV Diastolic Volume Index 02/01/2024 44.43     IVS 02/01/2024 1.50 (A)     LVOT diameter 02/01/2024 2.10     LVOT area 02/01/2024 3.5     FS 02/01/2024 17 (A)     Left Ventricle Relative * 02/01/2024 0.65     Posterior Wall 02/01/2024 1.50 (A)     LV mass 02/01/2024 284.84     LV Mass Index 02/01/2024 130     MV Peak E Kike 02/01/2024 1.37     TDI LATERAL 02/01/2024 0.16     TDI SEPTAL 02/01/2024 0.09     E/E' ratio 02/01/2024 10.96     MV Peak A Kike 02/01/2024 0.38     TR Max Kike 02/01/2024 2.63     E/A ratio 02/01/2024 3.61     E wave deceleration time 02/01/2024 143.00     LV SEPTAL E/E' RATIO 02/01/2024 15.22     LV LATERAL E/E' RATIO 02/01/2024 8.56     LVOT peak kike 02/01/2024 0.95     Left  Ventricular Outflow* 02/01/2024 0.65     Left Ventricular Outflow* 02/01/2024 3.00     RVDD 02/01/2024 3.58     TAPSE 02/01/2024 1.83     LA size 02/01/2024 4.90     LA volume (mod) 02/01/2024 138.00     LA Volume Index (Mod) 02/01/2024 63.0     AV mean gradient 02/01/2024 24     AV peak gradient 02/01/2024 45     Ao peak chip 02/01/2024 3.34     Ao VTI 02/01/2024 61.00     LVOT peak VTI 02/01/2024 18.50     AV valve area 02/01/2024 1.05     AV Velocity Ratio 02/01/2024 0.28     AV index (prosthetic) 02/01/2024 0.30     KAREN by Velocity Ratio 02/01/2024 0.98     MV mean gradient 02/01/2024 3     MV peak gradient 02/01/2024 9     MV valve area by continu* 02/01/2024 2.06     MV VTI 02/01/2024 31.1     Triscuspid Valve Regurgi* 02/01/2024 28     PV PEAK VELOCITY 02/01/2024 1.03     PV peak gradient 02/01/2024 4     Mean e' 02/01/2024 0.13     ZLVIDS 02/01/2024 -1.33     ZLVIDD 02/01/2024 -4.75     WBC 02/01/2024 7.00     RBC 02/01/2024 4.13 (L)     Hgb 02/01/2024 10.7 (L)     Hct 02/01/2024 35.2 (L)     MCV 02/01/2024 85.2     MCH 02/01/2024 25.9 (L)     MCHC 02/01/2024 30.4 (L)     RDW 02/01/2024 15.0     Platelet 02/01/2024 197     MPV 02/01/2024 10.3     NRBC% 02/01/2024 0.0     Sodium Level 02/01/2024 142     Potassium Level 02/01/2024 3.8     Chloride 02/01/2024 99     Carbon Dioxide 02/01/2024 31     Glucose Level 02/01/2024 107     Blood Urea Nitrogen 02/01/2024 22.0 (H)     Creatinine 02/01/2024 0.87     BUN/Creatinine Ratio 02/01/2024 25     Calcium Level Total 02/01/2024 9.2     Anion Gap 02/01/2024 12.0     eGFR 02/01/2024 >60     Magnesium Level 02/01/2024 2.60     Iron Binding Capacity Un* 02/01/2024 325 (H)     Iron Level 02/01/2024 38 (L)     Transferrin 02/01/2024 344     Iron Binding Capacity To* 02/01/2024 363     Iron Saturation 02/01/2024 10 (L)     Ferritin Level 02/01/2024 24.32     Folate Level 02/01/2024 8.1     Vitamin B12 Level 02/01/2024 761     Troponin-I 02/01/2024 0.016     Sodium Level  02/02/2024 138     Potassium Level 02/02/2024 3.9     Chloride 02/02/2024 96 (L)     Carbon Dioxide 02/02/2024 29     Glucose Level 02/02/2024 111     Blood Urea Nitrogen 02/02/2024 27.0 (H)     Creatinine 02/02/2024 1.06 (H)     BUN/Creatinine Ratio 02/02/2024 25     Calcium Level Total 02/02/2024 9.0     Anion Gap 02/02/2024 13.0     eGFR 02/02/2024 52     WBC 02/02/2024 7.53     RBC 02/02/2024 4.32     Hgb 02/02/2024 11.2 (L)     Hct 02/02/2024 37.4     MCV 02/02/2024 86.6     MCH 02/02/2024 25.9 (L)     MCHC 02/02/2024 29.9 (L)     RDW 02/02/2024 14.9     Platelet 02/02/2024 211     MPV 02/02/2024 10.5 (H)     Neut % 02/02/2024 72.9     Lymph % 02/02/2024 11.7     Mono % 02/02/2024 11.2     Eos % 02/02/2024 2.9     Basophil % 02/02/2024 0.9     Lymph # 02/02/2024 0.88     Neut # 02/02/2024 5.49     Mono # 02/02/2024 0.84     Eos # 02/02/2024 0.22     Baso # 02/02/2024 0.07     IG# 02/02/2024 0.03     IG% 02/02/2024 0.4     NRBC% 02/02/2024 0.0         Microbiology Results (last 7 days)       ** No results found for the last 168 hours. **             X-Ray Chest PA And Lateral    Result Date: 1/31/2024  EXAMINATION: XR CHEST PA AND LATERAL CLINICAL HISTORY: Shortness of breath TECHNIQUE: Two views of the chest COMPARISON: No prior imaging available for comparison. FINDINGS: Cardiomegaly remains with increasing right hilar prominence.  This is likely projectional.  Recommend continued follow-up to exclude developing infectious process.     Cardiomegaly remains with increasing right hilar prominence. This is likely projectional.  Recommend continued follow-up to exclude developing infectious process. Electronically signed by: Bhavik Buchanan Date:    01/31/2024 Time:    09:23  - pulls last radiology orders        Medication List        CHANGE how you take these medications      furosemide 40 MG tablet  Commonly known as: LASIX  Take 1 tablet (40 mg total) by mouth 2 (two) times a day.  What changed: when to take  this     hydrALAZINE 50 MG tablet  Commonly known as: APRESOLINE  Take 2 tablets (100 mg total) by mouth every 12 (twelve) hours.  What changed:   how much to take  when to take this            CONTINUE taking these medications      amLODIPine 5 MG tablet  Commonly known as: NORVASC     clonazePAM 0.5 MG tablet  Commonly known as: KlonoPIN  TAKE ONE TABLET BY MOUTH DAILY EVERY EVENING     ELIQUIS 5 mg Tab  Generic drug: apixaban     losartan 100 MG tablet  Commonly known as: COZAAR  TAKE ONE TABLET BY MOUTH DAILY FOR BLOOD PRESSURE     metoprolol succinate 25 MG 24 hr tablet  Commonly known as: TOPROL-XL               Where to Get Your Medications        These medications were sent to [facility] - [place] - [address]  [address], [place] [address]      Phone: [phone]   furosemide 40 MG tablet          Explained in detail to the patient about the discharge plan, medications, and follow-up visits. Pt understands and agrees with the treatment plan  Discharged Condition: stable  Diet: cardiac/low sodium  Disposition:    Medications Per [facility] med rec  Activities as tolerated  Follow up with your PCP in 2 wks   For further questions contact hospitalist office    Discharge time 33 minutes    For worsening symptoms, chest pain, shortness of breath, increased abdominal pain, high grade fever, stroke or stroke like symptoms, immediately go to the nearest Emergency Room or call 911 as soon as possible.        Jason Davis M.D, on 2/2/2024. at 6:41 AM.

## 2024-02-03 PROCEDURE — G0180 MD CERTIFICATION HHA PATIENT: HCPCS | Mod: ,,, | Performed by: FAMILY MEDICINE

## 2024-02-05 ENCOUNTER — PATIENT OUTREACH (OUTPATIENT)
Dept: ADMINISTRATIVE | Facility: CLINIC | Age: 84
End: 2024-02-05
Payer: MEDICARE

## 2024-02-05 NOTE — PROGRESS NOTES
C3 nurse spoke with Gwendolyn Trejo  for a TCC post hospital discharge follow up call. The patient has a scheduled HOSFU appointment with  Daniele Marie MD 2/9/24 @4pm

## 2024-02-08 LAB
OHS CV EVENT MONITOR DAY: 0
OHS CV HOLTER LENGTH DECIMAL HOURS: 72
OHS CV HOLTER LENGTH HOURS: 72
OHS CV HOLTER LENGTH MINUTES: 0
OHS CV HOLTER SINUS AVERAGE HR: 81
OHS CV HOLTER SINUS MAX HR: 119
OHS CV HOLTER SINUS MIN HR: 60

## 2024-02-17 ENCOUNTER — EXTERNAL HOME HEALTH (OUTPATIENT)
Dept: HOME HEALTH SERVICES | Facility: HOSPITAL | Age: 84
End: 2024-02-17
Payer: MEDICARE

## 2024-02-26 ENCOUNTER — PATIENT MESSAGE (OUTPATIENT)
Dept: ADMINISTRATIVE | Facility: OTHER | Age: 84
End: 2024-02-26
Payer: MEDICARE

## 2024-02-29 ENCOUNTER — OFFICE VISIT (OUTPATIENT)
Dept: FAMILY MEDICINE | Facility: CLINIC | Age: 84
End: 2024-02-29
Payer: MEDICARE

## 2024-02-29 VITALS
SYSTOLIC BLOOD PRESSURE: 146 MMHG | WEIGHT: 246 LBS | HEIGHT: 63 IN | HEART RATE: 89 BPM | RESPIRATION RATE: 20 BRPM | BODY MASS INDEX: 43.59 KG/M2 | TEMPERATURE: 98 F | OXYGEN SATURATION: 97 % | DIASTOLIC BLOOD PRESSURE: 84 MMHG

## 2024-02-29 DIAGNOSIS — I50.33 ACUTE ON CHRONIC HEART FAILURE WITH PRESERVED EJECTION FRACTION (HFPEF): Primary | ICD-10-CM

## 2024-02-29 DIAGNOSIS — T45.7X1A BLEEDING DISORDER DUE TO CONSUMPTION OF COAGULANTS: ICD-10-CM

## 2024-02-29 DIAGNOSIS — I10 ESSENTIAL HYPERTENSION: ICD-10-CM

## 2024-02-29 DIAGNOSIS — I48.0 PAROXYSMAL ATRIAL FIBRILLATION: ICD-10-CM

## 2024-02-29 DIAGNOSIS — E66.01 CLASS 3 SEVERE OBESITY DUE TO EXCESS CALORIES WITH SERIOUS COMORBIDITY AND BODY MASS INDEX (BMI) OF 40.0 TO 44.9 IN ADULT: ICD-10-CM

## 2024-02-29 DIAGNOSIS — D68.9 BLEEDING DISORDER DUE TO CONSUMPTION OF COAGULANTS: ICD-10-CM

## 2024-02-29 PROCEDURE — G0439 PPPS, SUBSEQ VISIT: HCPCS | Mod: ,,,

## 2024-02-29 NOTE — ASSESSMENT & PLAN NOTE
Continue Eliquis 5 mg BID + metoprolol succinate XL 25 mg daily.      Monitor blood pressure daily and log. Report consistent numbers greater than 140/90.  Avoid caffeine and alcohol.   Call 911 and/or report to ER if you feel as if your heart is racing, have chest pain, dizziness, or trouble breathing.

## 2024-02-29 NOTE — ASSESSMENT & PLAN NOTE
Continue Lasix 40 mg BID.   Last ECHO on  showed EF of 55-60 %.  Notify the clinic if you gain 3 or more pounds in one day or 5 or more pounds in one week.  Stressed importance of daily morning weights after urination but prior to breakfast on the same scale.  Low Sodium Diet (Dash Diet - less than 2 grams of sodium per day).  Follow a low cholesterol, low saturated fat diet with less that 200mg of cholesterol a day.  Cut down on alcohol if you have more than 1 drink a day (for women) or 2 drinks a day (for men).  Maintain healthy weight with goal BMI <30. Perform 30 minutes of daily physical activity 5 days per week.  Report to ER for chest pain, SOB, difficulty breathing, abdominal distention or significant edema to lower extremities.  Continue following up with Cardiology - Dr. Marie & Dr. Brantley

## 2024-02-29 NOTE — PROGRESS NOTES
Patient ID: [id number]     Chief Complaint: Medicare Annual Wellness     HPI:     Gwendolyn Trejo is a 83 y.o. female here today for a Medicare Annual Wellness visit and comprehensive Health Risk Assessment.     Health Maintenance   Topic Date Due    TETANUS VACCINE  Never done    DEXA Scan  Never done    Shingles Vaccine (1 of 2) Never done    Lipid Panel  2029        Past Medical History:   Diagnosis Date    Anemia of chronic disease     Anxiety disorder     Aortic stenosis     Atrial fibrillation     CAD (coronary artery disease)     Congestive heart failure (CHF)     Coronary arteriosclerosis     Crushing injury of right foot     Diastolic dysfunction     Diverticulosis     History of Coumadin therapy     History of transient ischemic attack (TIA)     HTN (hypertension)     Hypoxia     JOAQUIN (iron deficiency anemia)     Inflamed seborrheic keratosis     Insomnia     Lumbar radiculopathy     Mitral regurgitation     Neovascular age-related macular degeneration     Nodular calcific aortic valve stenosis     Pseudophakia of both eyes     Renal scarring     S/P TAVR (transcatheter aortic valve replacement) 2023    Sciatica     Severe aortic stenosis     Transient cerebral ischemia         Past Surgical History:   Procedure Laterality Date    ANKLE GANGLION CYST EXCISION Left     CARDIAC CATHETERIZATION  2013    Dr Yadav    CATARACT EXTRACTION Bilateral      SECTION       SECTION      TRANSCATHETER AORTIC VALVE REPLACEMENT (TAVR)  2023    Dr Mauricio Horn        Social History     Socioeconomic History    Marital status:    Tobacco Use    Smoking status: Never    Smokeless tobacco: Never   Substance and Sexual Activity    Alcohol use: Never    Drug use: Never    Sexual activity: Not Currently     Social Determinants of Health     Financial Resource Strain: Patient Unable To Answer (2024)    Overall Financial Resource Strain (CARDIA)     Difficulty of Paying Living  Expenses: Patient unable to answer   Food Insecurity: Patient Unable To Answer (2/2/2024)    Hunger Vital Sign     Worried About Running Out of Food in the Last Year: Patient unable to answer     Ran Out of Food in the Last Year: Patient unable to answer   Transportation Needs: Patient Unable To Answer (2/2/2024)    PRAPARE - Transportation     Lack of Transportation (Medical): Patient unable to answer     Lack of Transportation (Non-Medical): Patient unable to answer   Physical Activity: Insufficiently Active (6/19/2023)    Exercise Vital Sign     Days of Exercise per Week: 5 days     Minutes of Exercise per Session: 20 min   Stress: Patient Unable To Answer (2/2/2024)    [employer] - Occupational Stress Questionnaire     Feeling of Stress : Patient unable to answer   Social Connections: Socially Isolated (2/1/2024)    Social Connection and Isolation Panel [NHANES]     Frequency of Communication with Friends and Family: Three times a week     Frequency of Social Gatherings with Friends and Family: Three times a week     Attends Jainism Services: Never     Active Member of Clubs or Organizations: No     Attends Club or Organization Meetings: Never     Marital Status:    Housing Stability: Patient Unable To Answer (2/2/2024)    Housing Stability Vital Sign     Unable to Pay for Housing in the Last Year: Patient unable to answer     Number of Places Lived in the Last Year: 1     Unstable Housing in the Last Year: Patient unable to answer        Family History   Problem Relation Age of Onset    Heart failure Mother     Parkinsonism Father     Valvular heart disease Sister         infant - cause of death        Current Outpatient Medications   Medication Instructions    amLODIPine (NORVASC) 5 mg, Oral, Daily    clonazePAM (KLONOPIN) 0.5 MG tablet TAKE ONE TABLET BY MOUTH DAILY EVERY EVENING    ELIQUIS 5 mg, Oral, 2 times daily    furosemide (LASIX) 40 mg, Oral, 2 times daily     "hydrALAZINE (APRESOLINE) 100 mg, Oral, Every 12 hours    losartan (COZAAR) 100 MG tablet TAKE ONE TABLET BY MOUTH DAILY FOR BLOOD PRESSURE    metoprolol succinate (TOPROL-XL) 25 mg, Oral, Nightly       Review of patient's allergies indicates:   Allergen Reactions    Penicillins Hives    Penicillin           There is no immunization history on file for this patient.     Patient Care Team:  Estuardo Soriano DO as PCP - General (Family Medicine)  Salbador Scott Jr., MD as Consulting Physician (Ophthalmology)  Daniele Marie MD as Consulting Physician (Cardiology)  Jose Yadav MD as Consulting Physician (Vascular Surgery)  Mauricio Horn MD as Consulting Physician (Cardiology)  Steven Lozada - (Home Health Services)    Subjective:     Review of Systems    12 point review of systems conducted, negative except as stated in the history of present illness. See HPI for details.    Objective:     Visit Vitals  BP (!) 146/84 (BP Location: Right arm, Patient Position: Sitting, BP Method: Large (Automatic))   Pulse 89   Temp 97.9 °F (36.6 °C)   Resp 20   Ht 5' 3" (1.6 m)   Wt 111.6 kg (246 lb)   SpO2 97%   BMI 43.58 kg/m²       Physical Exam  Vitals and nursing note reviewed.   Constitutional:       General: She is not in acute distress.     Appearance: She is not ill-appearing.   HENT:      Head: Normocephalic and atraumatic.      Mouth/Throat:      Mouth: Mucous membranes are moist.      Pharynx: Oropharynx is clear.   Eyes:      General: No scleral icterus.     Extraocular Movements: Extraocular movements intact.      Conjunctiva/sclera: Conjunctivae normal.      Pupils: Pupils are equal, round, and reactive to light.   Neck:      Vascular: No carotid bruit.   Cardiovascular:      Rate and Rhythm: Normal rate and regular rhythm.      Heart sounds: No murmur heard.     No friction rub. No gallop.   Pulmonary:      Effort: Pulmonary effort is normal. No respiratory distress.      Breath sounds: " Normal breath sounds. No wheezing, rhonchi or rales.   Abdominal:      General: Abdomen is flat. Bowel sounds are normal. There is no distension.      Palpations: Abdomen is soft. There is no mass.      Tenderness: There is no abdominal tenderness.   Musculoskeletal:         General: Normal range of motion.      Cervical back: Normal range of motion and neck supple.      Right lower leg: No edema.      Left lower leg: No edema.   Skin:     General: Skin is warm and dry.   Neurological:      General: No focal deficit present.      Mental Status: She is alert.   Psychiatric:         Mood and Affect: Mood normal.         Labs Reviewed:     Chemistry:  Lab Results   Component Value Date     02/02/2024    K 3.9 02/02/2024    CHLORIDE 96 (L) 02/02/2024    BUN 27.0 (H) 02/02/2024    CREATININE 1.06 (H) 02/02/2024    EGFRNORACEVR 52 02/02/2024    GLUCOSE 111 02/02/2024    CALCIUM 9.0 02/02/2024    ALKPHOS 60 01/31/2024    LABPROT 7.5 01/31/2024    ALBUMIN 4.1 01/31/2024    BILIDIR 0.4 12/22/2020    IBILI 0.60 12/22/2020    AST 19 01/31/2024    ALT 18 01/31/2024    MG 2.60 02/01/2024    XIALFYSG37CF 26.4 (L) 01/23/2024    TSH 1.958 01/23/2024      Hematology:  Lab Results   Component Value Date    WBC 7.53 02/02/2024    HGB 11.2 (L) 02/02/2024    HCT 37.4 02/02/2024     02/02/2024       Lipid Panel:  Lab Results   Component Value Date    CHOL 152 01/23/2024    HDL 59 01/23/2024    LDL 80.00 01/23/2024    TRIG 63 01/23/2024    TOTALCHOLEST 3 01/23/2024        Urine:  Lab Results   Component Value Date    COLORUA Yellow 01/23/2024    APPEARANCEUA Clear 01/23/2024    SGUA 1.015 01/23/2024    PHUA 6.5 01/23/2024    PROTEINUA Negative 01/23/2024    GLUCOSEUA Negative 01/23/2024    KETONESUA Negative 01/23/2024    BLOODUA Negative 01/23/2024    NITRITESUA Negative 01/23/2024    LEUKOCYTESUR Negative 01/23/2024    RBCUA 0-2 03/17/2023    WBCUA 3-5 03/17/2023    BACTERIA Rare 03/17/2023        Assessment:        ICD-10-CM ICD-9-CM   1. Acute on chronic heart failure with preserved ejection fraction (HFpEF)  I50.33 428.23   2. Paroxysmal atrial fibrillation  I48.0 427.31   3. Essential hypertension  I10 401.9   4. Class 3 severe obesity due to excess calories with serious comorbidity and body mass index (BMI) of 40.0 to 44.9 in adult  E66.01 278.01    Z68.41 V85.41   5. 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 Acute on chronic heart failure with preserved ejection fraction (HFpEF)  Assessment & Plan:  Continue Lasix 40 mg BID.   Last ECHO on  showed EF of 55-60 %.  Notify the clinic if you gain 3 or more pounds in one day or 5 or more pounds in one week.  Stressed importance of daily morning weights after urination but prior to breakfast on the same scale.  Low Sodium Diet (Dash Diet - less than 2 grams of sodium per day).  Follow a low cholesterol, low saturated fat diet with less that 200mg of cholesterol a day.  Cut down on alcohol if you have more than 1 drink a day (for women) or 2 drinks a day (for men).  Maintain healthy weight with goal BMI <30. Perform 30 minutes of daily physical activity 5 days per week.  Report to ER for chest pain, SOB, difficulty breathing, abdominal distention or significant edema to lower extremities.  Continue following up with Cardiology - Dr. Marie & Dr. Brantley       2. Paroxysmal atrial fibrillation  Assessment & Plan:  Continue Eliquis 5 mg BID + metoprolol succinate XL 25 mg daily.      Monitor blood pressure daily and log. Report consistent numbers greater than 140/90.  Avoid caffeine and alcohol.   Call 911 and/or report to ER if you feel as if your heart is racing, have chest pain, dizziness, or trouble breathing.      3. Essential hypertension  Assessment & Plan:  Slightly elevated today in clinic.   Continue Losartan 100 mg daily + Hydralazine 100 mg BID.   Low Sodium Diet (DASH Diet - Less than 2 grams of sodium per  day).  Monitor blood pressure daily and log. Report consistent numbers greater than 140/90.  Maintain healthy weight with goal BMI <30.         4. Class 3 severe obesity due to excess calories with serious comorbidity and body mass index (BMI) of 40.0 to 44.9 in adult  Assessment & Plan:  Body mass index is 43.58 kg/m².  Goal BMI <30.  Avoid soda, simple sugars, excessive rice, potatoes or bread. Limit fast foods and fried foods.  Choose complex carbs in moderation (example: green vegetables, beans, oatmeal). Eat plenty of fresh fruits and vegetables with lean meats daily.  Do not skip meals. Eat a balanced portion size.  Avoid fad diets. Consider permanent healthy life style changes.       5. Bleeding disorder due to consumption of coagulants  Assessment & Plan:  Stable. Patient is on eliquis due to Chronic Atrial fibrillation.          The following assessments were completed and reviewed. See completed screening forms and assessments within the Encounter Summary.  [x] Health Risk Assessment   [x] CVD Risk Factors - Reviewed  [x] Obesity/Physical Activity -  Encouraged daily 30 minute physical activity x 5 days per week.   [x] Home Safety/Living Situation  [x] CAGE  [x] Depression (PHQ) Screen  [x] Timed Get Up and Go  [x] Whisper Test  [x] Cognitive Function/Impairment Screen  [x] Nutrition Screening  [x] ADL Screen  [x] Opioid Screen:  [x] Patient does not have a prescription for opioids.   [] Patient has a prescription for opioids but is at low risk for abuse.   [x] Substance Abuse Screen:   [x] Patient does not use substances.   [x] Advanced Care Planning:  Advance Care Planning   Date: 02/29/2024    Living Will  During this visit, I engaged the patient  in the voluntary advance care planning process.  The patient and I reviewed the role for advance directives and their purpose in directing future healthcare if the patient's unable to speak for him/herself.  At this point in time, the patient does have full  decision-making capacity.  We discussed different extreme health states that she could experience, and reviewed what kind of medical care she would want in those situations.  The patient communicated that if she were comatose and had little chance of a meaningful recovery, she would not want machines/life-sustaining treatments used.  I spent a total of 5 minutes engaging the patient in this advance care planning discussion.              Provided patient with a 5-10 year written screening schedule and personal prevention plan. Recommendations were developed using the USPSTF age appropriate recommendations. Education, counseling, and referrals were provided as needed. After Visit Summary printed and given to patient, which includes a list of additional screenings\tests needed.    Follow up in about 6 months (around 8/29/2024) for Follow Up with Dr. Soriano . In addition to their scheduled follow up, the patient has also been instructed to follow up on as needed basis.     Future Appointments   Date Time Provider Department Center   8/29/2024  9:45 AM Estuardo Soriano DO [facility]   3/3/2025  9:00 AM Estuardo Soriano DO [facility] Ka'Ryn Franklin, NP

## 2024-03-04 PROBLEM — E66.813 CLASS 3 SEVERE OBESITY DUE TO EXCESS CALORIES WITH SERIOUS COMORBIDITY AND BODY MASS INDEX (BMI) OF 40.0 TO 44.9 IN ADULT: Status: ACTIVE | Noted: 2022-11-01

## 2024-03-04 NOTE — ASSESSMENT & PLAN NOTE
Slightly elevated today in clinic.   Continue Losartan 100 mg daily + Hydralazine 100 mg BID.   Low Sodium Diet (DASH Diet - Less than 2 grams of sodium per day).  Monitor blood pressure daily and log. Report consistent numbers greater than 140/90.  Maintain healthy weight with goal BMI <30.

## 2024-03-04 NOTE — ASSESSMENT & PLAN NOTE
Body mass index is 43.58 kg/m².  Goal BMI <30.  Avoid soda, simple sugars, excessive rice, potatoes or bread. Limit fast foods and fried foods.  Choose complex carbs in moderation (example: green vegetables, beans, oatmeal). Eat plenty of fresh fruits and vegetables with lean meats daily.  Do not skip meals. Eat a balanced portion size.  Avoid fad diets. Consider permanent healthy life style changes.

## 2024-05-13 DIAGNOSIS — G47.00 INSOMNIA, UNSPECIFIED TYPE: Chronic | ICD-10-CM

## 2024-05-13 DIAGNOSIS — I10 ESSENTIAL HYPERTENSION: ICD-10-CM

## 2024-05-13 RX ORDER — LOSARTAN POTASSIUM 100 MG/1
TABLET ORAL
Qty: 90 TABLET | Refills: 1 | Status: SHIPPED | OUTPATIENT
Start: 2024-05-13

## 2024-05-13 RX ORDER — CLONAZEPAM 0.5 MG/1
TABLET ORAL
Qty: 30 TABLET | Refills: 5 | Status: SHIPPED | OUTPATIENT
Start: 2024-05-13

## 2024-05-14 DIAGNOSIS — I10 ESSENTIAL HYPERTENSION: ICD-10-CM

## 2024-05-14 RX ORDER — FUROSEMIDE 40 MG/1
40 TABLET ORAL 2 TIMES DAILY
Qty: 90 TABLET | Refills: 1 | Status: SHIPPED | OUTPATIENT
Start: 2024-05-14

## 2024-05-16 ENCOUNTER — HOSPITAL ENCOUNTER (OUTPATIENT)
Dept: RADIOLOGY | Facility: HOSPITAL | Age: 84
Discharge: HOME | End: 2024-05-16
Payer: MEDICARE

## 2024-05-16 ENCOUNTER — TELEPHONE (OUTPATIENT)
Dept: FAMILY MEDICINE | Facility: CLINIC | Age: 84
End: 2024-05-16

## 2024-05-16 ENCOUNTER — OFFICE VISIT (OUTPATIENT)
Dept: FAMILY MEDICINE | Facility: CLINIC | Age: 84
End: 2024-05-16
Payer: MEDICARE

## 2024-05-16 VITALS
WEIGHT: 253.38 LBS | DIASTOLIC BLOOD PRESSURE: 76 MMHG | SYSTOLIC BLOOD PRESSURE: 145 MMHG | RESPIRATION RATE: 20 BRPM | OXYGEN SATURATION: 95 % | HEART RATE: 90 BPM | HEIGHT: 63 IN | TEMPERATURE: 98 F | BODY MASS INDEX: 44.89 KG/M2

## 2024-05-16 DIAGNOSIS — M79.645 PAIN OF LEFT THUMB: ICD-10-CM

## 2024-05-16 DIAGNOSIS — M18.12 DEGENERATIVE ARTHRITIS OF THUMB, LEFT: Primary | ICD-10-CM

## 2024-05-16 PROCEDURE — 1160F RVW MEDS BY RX/DR IN RCRD: CPT | Mod: CPTII,,,

## 2024-05-16 PROCEDURE — 3288F FALL RISK ASSESSMENT DOCD: CPT | Mod: CPTII,,,

## 2024-05-16 PROCEDURE — 1125F AMNT PAIN NOTED PAIN PRSNT: CPT | Mod: CPTII,,,

## 2024-05-16 PROCEDURE — 99214 OFFICE O/P EST MOD 30 MIN: CPT | Mod: ,,,

## 2024-05-16 PROCEDURE — 3077F SYST BP >= 140 MM HG: CPT | Mod: CPTII,,,

## 2024-05-16 PROCEDURE — 73130 X-RAY EXAM OF HAND: CPT | Mod: TC,LT

## 2024-05-16 PROCEDURE — 3078F DIAST BP <80 MM HG: CPT | Mod: CPTII,,,

## 2024-05-16 PROCEDURE — 1101F PT FALLS ASSESS-DOCD LE1/YR: CPT | Mod: CPTII,,,

## 2024-05-16 PROCEDURE — 1159F MED LIST DOCD IN RCRD: CPT | Mod: CPTII,,,

## 2024-05-16 NOTE — TELEPHONE ENCOUNTER
----- Message from Sparkle Espinosa NP sent at 5/16/2024 11:56 AM CDT -----  All lab results within acceptable ranges.  Kidney function has improved.   Uric acid level is within normal ranges.

## 2024-05-16 NOTE — ASSESSMENT & PLAN NOTE
Discussed with the patient and daughter that pain is most likely arthritis of the joint. The patient can take Tylenol arthritis for pain relief. No NSAIDS.   She can also purchase a glove compression to assist in splints the hand for support.

## 2024-05-16 NOTE — PROGRESS NOTES
Patient ID: [id number]     Chief Complaint: thumb pain (Left thumb pain located near both joints x 1 month/Feels bruised)      HPI:     Gwendolyn Trejo, an 83-year-old woman, is currently present due to experiencing left thumb pain for the past month. She mentions that her thumb feels warm to the touch. The pain has intensified in the last week, limiting her ability to move her thumb completely. She denies lifting heavy objects or any hand trauma. Her daughter, accompanying her during the visit, mentions that they have been consuming a significant amount of seafood recently.    Past Medical History:   Diagnosis Date    Anemia of chronic disease     Anxiety disorder     Aortic stenosis     Atrial fibrillation     CAD (coronary artery disease)     Congestive heart failure (CHF)     Coronary arteriosclerosis     Crushing injury of right foot     Diastolic dysfunction     Diverticulosis     History of Coumadin therapy     History of transient ischemic attack (TIA)     HTN (hypertension)     Hypoxia     JOAQUIN (iron deficiency anemia)     Inflamed seborrheic keratosis     Insomnia     Lumbar radiculopathy     Mitral regurgitation     Neovascular age-related macular degeneration     Nodular calcific aortic valve stenosis     Pseudophakia of both eyes     Renal scarring     S/P TAVR (transcatheter aortic valve replacement) 2023    Sciatica     Severe aortic stenosis     Transient cerebral ischemia         Past Surgical History:   Procedure Laterality Date    ANKLE GANGLION CYST EXCISION Left     CARDIAC CATHETERIZATION  2013    Dr Yadav    CATARACT EXTRACTION Bilateral      SECTION       SECTION      TRANSCATHETER AORTIC VALVE REPLACEMENT (TAVR)  2023    Dr Mauricio Horn        Social History     Socioeconomic History    Marital status:    Tobacco Use    Smoking status: Never    Smokeless tobacco: Never   Substance and Sexual Activity    Alcohol use: Never    Drug use: Never     Sexual activity: Not Currently     Social Determinants of Health     Financial Resource Strain: Patient Unable To Answer (2/2/2024)    Overall Financial Resource Strain (CARDIA)     Difficulty of Paying Living Expenses: Patient unable to answer   Food Insecurity: Patient Unable To Answer (2/2/2024)    Hunger Vital Sign     Worried About Running Out of Food in the Last Year: Patient unable to answer     Ran Out of Food in the Last Year: Patient unable to answer   Transportation Needs: Patient Unable To Answer (2/2/2024)    PRAPARE - Transportation     Lack of Transportation (Medical): Patient unable to answer     Lack of Transportation (Non-Medical): Patient unable to answer   Physical Activity: Insufficiently Active (6/19/2023)    Exercise Vital Sign     Days of Exercise per Week: 5 days     Minutes of Exercise per Session: 20 min   Stress: Patient Unable To Answer (2/2/2024)    [employer] - Occupational Stress Questionnaire     Feeling of Stress : Patient unable to answer   Housing Stability: Patient Unable To Answer (2/2/2024)    Housing Stability Vital Sign     Unable to Pay for Housing in the Last Year: Patient unable to answer     Number of Places Lived in the Last Year: 1     Unstable Housing in the Last Year: Patient unable to answer        Current Outpatient Medications   Medication Instructions    amLODIPine (NORVASC) 5 mg, Oral, Daily    clonazePAM (KLONOPIN) 0.5 MG tablet TAKE ONE TABLET BY MOUTH DAILY EVERY EVENING    ELIQUIS 5 mg, Oral, 2 times daily    furosemide (LASIX) 40 mg, Oral, 2 times daily    hydrALAZINE (APRESOLINE) 100 mg, Oral, Every 12 hours    losartan (COZAAR) 100 MG tablet TAKE ONE TABLET BY MOUTH DAILY FOR BLOOD PRESSURE    metoprolol succinate (TOPROL-XL) 25 mg, Oral, Nightly       Review of patient's allergies indicates:   Allergen Reactions    Penicillins Hives    Penicillin         Patient Care Team:  Estuardo Soriano DO as PCP - General (Family  "Medicine)  Salbador Scott Jr., MD as Consulting Physician (Ophthalmology)  Daniele Marie MD as Consulting Physician (Cardiology)  Jose Yadav MD as Consulting Physician (Vascular Surgery)  Mauricio Horn MD as Consulting Physician (Cardiology)  [facility] - (Home Health Services)     Subjective:     Review of Systems    12 point review of systems conducted, negative except as stated in the history of present illness. See HPI for details.    Objective:     Visit Vitals  BP (!) 145/76 (BP Location: Left arm, Patient Position: Sitting, BP Method: Large (Automatic))   Pulse 90   Temp 97.9 °F (36.6 °C)   Resp 20   Ht 5' 3" (1.6 m)   Wt 114.9 kg (253 lb 6.4 oz)   SpO2 95%   BMI 44.89 kg/m²       Physical Exam  Vitals and nursing note reviewed.   Constitutional:       General: She is not in acute distress.     Appearance: She is not ill-appearing.   HENT:      Head: Normocephalic and atraumatic.      Mouth/Throat:      Mouth: Mucous membranes are moist.      Pharynx: Oropharynx is clear.   Eyes:      General: No scleral icterus.     Extraocular Movements: Extraocular movements intact.      Conjunctiva/sclera: Conjunctivae normal.      Pupils: Pupils are equal, round, and reactive to light.   Neck:      Vascular: No carotid bruit.   Cardiovascular:      Rate and Rhythm: Normal rate and regular rhythm.      Heart sounds: No murmur heard.     No friction rub. No gallop.   Pulmonary:      Effort: Pulmonary effort is normal. No respiratory distress.      Breath sounds: Normal breath sounds. No wheezing, rhonchi or rales.   Abdominal:      General: Abdomen is flat. Bowel sounds are normal. There is no distension.      Palpations: Abdomen is soft. There is no mass.      Tenderness: There is no abdominal tenderness.   Musculoskeletal:      Left hand: Tenderness present. No swelling. Decreased range of motion. Decreased strength. Normal capillary refill.      Cervical back: Normal range of motion " and neck supple.   Skin:     General: Skin is warm and dry.   Neurological:      General: No focal deficit present.      Mental Status: She is alert.   Psychiatric:         Mood and Affect: Mood normal.       Labs Reviewed:     Chemistry:  Lab Results   Component Value Date     05/16/2024    K 4.3 05/16/2024    CHLORIDE 105 05/16/2024    BUN 23.0 (H) 05/16/2024    CREATININE 0.98 05/16/2024    EGFRNORACEVR 57 05/16/2024    GLUCOSE 109 05/16/2024    CALCIUM 9.5 05/16/2024    ALKPHOS 55 05/16/2024    LABPROT 7.8 (H) 05/16/2024    ALBUMIN 4.0 05/16/2024    BILIDIR 0.4 12/22/2020    IBILI 0.60 12/22/2020    AST 16 05/16/2024    ALT 10 05/16/2024    MG 2.60 02/01/2024    [id number] 26.4 (L) 01/23/2024    TSH 1.958 01/23/2024      Hematology:  Lab Results   Component Value Date    WBC 6.37 05/16/2024    HGB 12.2 05/16/2024    HCT 38.6 05/16/2024     05/16/2024       Lipid Panel:  Lab Results   Component Value Date    CHOL 152 01/23/2024    HDL 59 01/23/2024    LDL 80.00 01/23/2024    TRIG 63 01/23/2024    TOTALCHOLEST 3 01/23/2024        Urine:  Lab Results   Component Value Date    COLORUA Yellow 01/23/2024    APPEARANCEUA Clear 01/23/2024    SGUA 1.015 01/23/2024    PHUA 6.5 01/23/2024    PROTEINUA Negative 01/23/2024    GLUCOSEUA Negative 01/23/2024    KETONESUA Negative 01/23/2024    BLOODUA Negative 01/23/2024    NITRITESUA Negative 01/23/2024    LEUKOCYTESUR Negative 01/23/2024    RBCUA 0-2 03/17/2023    WBCUA 3-5 03/17/2023    BACTERIA Rare 03/17/2023        Assessment:       ICD-10-CM ICD-9-CM   1. Degenerative arthritis of thumb, left  M18.12 715.34        Plan:     1. Degenerative arthritis of thumb, left  Assessment & Plan:  Discussed with the patient and daughter that pain is most likely arthritis of the joint. The patient can take Tylenol arthritis for pain relief. No NSAIDS.   She can also purchase a glove compression to assist in splints the hand for support.     Orders:  -     X-Ray Hand  Complete Left; Future; Expected date: 05/16/2024  -     CBC Auto Differential; Future; Expected date: 05/16/2024  -     Comprehensive Metabolic Panel; Future; Expected date: 05/16/2024  -     Uric Acid; Future; Expected date: 05/16/2024      Follow up if symptoms worsen or fail to improve. In addition to their scheduled follow up, the patient has also been instructed to follow up on as needed basis.     Future Appointments   Date Time Provider Department Center   8/29/2024  9:40 AM Estuardo Soriano DO [facility]   3/3/2025  9:20 AM Estuardo Soriano DO [facility] Ka'Ryn Franklin, NP

## 2024-05-16 NOTE — TELEPHONE ENCOUNTER
----- Message from Sparkle Espinosa NP sent at 5/16/2024 11:57 AM CDT -----  XR of left hand shows degenerative changes. Recommend Tylenol arthritis and she can brace her arm for stabilization.

## 2024-08-29 ENCOUNTER — LAB VISIT (OUTPATIENT)
Dept: LAB | Facility: HOSPITAL | Age: 84
End: 2024-08-29
Attending: FAMILY MEDICINE
Payer: MEDICARE

## 2024-08-29 ENCOUNTER — OFFICE VISIT (OUTPATIENT)
Dept: FAMILY MEDICINE | Facility: CLINIC | Age: 84
End: 2024-08-29
Payer: MEDICARE

## 2024-08-29 VITALS
OXYGEN SATURATION: 95 % | HEIGHT: 63 IN | DIASTOLIC BLOOD PRESSURE: 78 MMHG | WEIGHT: 254 LBS | HEART RATE: 82 BPM | RESPIRATION RATE: 18 BRPM | BODY MASS INDEX: 45 KG/M2 | SYSTOLIC BLOOD PRESSURE: 144 MMHG | TEMPERATURE: 97 F

## 2024-08-29 DIAGNOSIS — M54.41 CHRONIC MIDLINE LOW BACK PAIN WITH BILATERAL SCIATICA: ICD-10-CM

## 2024-08-29 DIAGNOSIS — N18.31 CHRONIC KIDNEY DISEASE, STAGE 3A: ICD-10-CM

## 2024-08-29 DIAGNOSIS — I10 ESSENTIAL HYPERTENSION: ICD-10-CM

## 2024-08-29 DIAGNOSIS — M54.42 CHRONIC MIDLINE LOW BACK PAIN WITH BILATERAL SCIATICA: Primary | ICD-10-CM

## 2024-08-29 DIAGNOSIS — G89.29 CHRONIC MIDLINE LOW BACK PAIN WITH BILATERAL SCIATICA: Primary | ICD-10-CM

## 2024-08-29 DIAGNOSIS — M54.42 CHRONIC MIDLINE LOW BACK PAIN WITH BILATERAL SCIATICA: ICD-10-CM

## 2024-08-29 DIAGNOSIS — M54.41 CHRONIC MIDLINE LOW BACK PAIN WITH BILATERAL SCIATICA: Primary | ICD-10-CM

## 2024-08-29 DIAGNOSIS — G89.29 CHRONIC MIDLINE LOW BACK PAIN WITH BILATERAL SCIATICA: ICD-10-CM

## 2024-08-29 LAB
ALBUMIN SERPL-MCNC: 4.3 G/DL (ref 3.4–4.8)
ALBUMIN/GLOB SERPL: 1.1 RATIO (ref 1.1–2)
ALP SERPL-CCNC: 67 UNIT/L (ref 40–150)
ALT SERPL-CCNC: 14 UNIT/L (ref 0–55)
ANION GAP SERPL CALC-SCNC: 11 MEQ/L
AST SERPL-CCNC: 15 UNIT/L (ref 5–34)
BILIRUB SERPL-MCNC: 1.1 MG/DL
BILIRUB UR QL STRIP.AUTO: NEGATIVE
BUN SERPL-MCNC: 26 MG/DL (ref 9.8–20.1)
CALCIUM SERPL-MCNC: 10 MG/DL (ref 8.4–10.2)
CHLORIDE SERPL-SCNC: 100 MMOL/L (ref 98–107)
CLARITY UR: CLEAR
CO2 SERPL-SCNC: 29 MMOL/L (ref 23–31)
COLOR UR AUTO: YELLOW
CREAT SERPL-MCNC: 1.15 MG/DL (ref 0.55–1.02)
CREAT/UREA NIT SERPL: 23
GFR SERPLBLD CREATININE-BSD FMLA CKD-EPI: 46 ML/MIN/1.73/M2
GLOBULIN SER-MCNC: 4 GM/DL (ref 2.4–3.5)
GLUCOSE SERPL-MCNC: 117 MG/DL (ref 82–115)
GLUCOSE UR QL STRIP: NEGATIVE
HGB UR QL STRIP: NEGATIVE
KETONES UR QL STRIP: NEGATIVE
LEUKOCYTE ESTERASE UR QL STRIP: NEGATIVE
NITRITE UR QL STRIP: NEGATIVE
PH UR STRIP: 7 [PH]
POTASSIUM SERPL-SCNC: 4.3 MMOL/L (ref 3.5–5.1)
PROT SERPL-MCNC: 8.3 GM/DL (ref 5.8–7.6)
PROT UR QL STRIP: NEGATIVE
SODIUM SERPL-SCNC: 140 MMOL/L (ref 136–145)
SP GR UR STRIP.AUTO: 1.01 (ref 1–1.03)
UROBILINOGEN UR STRIP-ACNC: 0.2

## 2024-08-29 PROCEDURE — 81003 URINALYSIS AUTO W/O SCOPE: CPT

## 2024-08-29 PROCEDURE — 1160F RVW MEDS BY RX/DR IN RCRD: CPT | Mod: CPTII,,, | Performed by: FAMILY MEDICINE

## 2024-08-29 PROCEDURE — 99214 OFFICE O/P EST MOD 30 MIN: CPT | Mod: ,,, | Performed by: FAMILY MEDICINE

## 2024-08-29 PROCEDURE — 1101F PT FALLS ASSESS-DOCD LE1/YR: CPT | Mod: CPTII,,, | Performed by: FAMILY MEDICINE

## 2024-08-29 PROCEDURE — 3288F FALL RISK ASSESSMENT DOCD: CPT | Mod: CPTII,,, | Performed by: FAMILY MEDICINE

## 2024-08-29 PROCEDURE — 36415 COLL VENOUS BLD VENIPUNCTURE: CPT

## 2024-08-29 PROCEDURE — 1159F MED LIST DOCD IN RCRD: CPT | Mod: CPTII,,, | Performed by: FAMILY MEDICINE

## 2024-08-29 PROCEDURE — 80053 COMPREHEN METABOLIC PANEL: CPT

## 2024-08-29 PROCEDURE — 1125F AMNT PAIN NOTED PAIN PRSNT: CPT | Mod: CPTII,,, | Performed by: FAMILY MEDICINE

## 2024-08-29 NOTE — PROGRESS NOTES
Patient ID: [id number]     Chief Complaint: 6 month f/u and Back Pain    HPI:     Gwendolyn Trejo is a 84 y.o. female here today for 6 month f/u and Back Pain. Back pain on average is a 7-8/10.   -------------------------------------    Anemia of chronic disease    Anxiety disorder    Aortic stenosis    Atrial fibrillation    CAD (coronary artery disease)    Congestive heart failure (CHF)    Coronary arteriosclerosis    Crushing injury of right foot    Diastolic dysfunction    Diverticulosis    History of Coumadin therapy    History of transient ischemic attack (TIA)    HTN (hypertension)    Hypoxia    JOAQUIN (iron deficiency anemia)    Inflamed seborrheic keratosis    Insomnia    Lumbar radiculopathy    Mitral regurgitation    Neovascular age-related macular degeneration    Nodular calcific aortic valve stenosis    Pseudophakia of both eyes    Renal scarring    S/P TAVR (transcatheter aortic valve replacement)    Sciatica    Severe aortic stenosis    Transient cerebral ischemia        Past Surgical History:   Procedure Laterality Date    ANKLE GANGLION CYST EXCISION Left     CARDIAC CATHETERIZATION  2013    Dr Yadav    CATARACT EXTRACTION Bilateral      SECTION       SECTION      TRANSCATHETER AORTIC VALVE REPLACEMENT (TAVR)  2023    Dr Mauricio Horn       Review of patient's allergies indicates:   Allergen Reactions    Penicillins Hives    Penicillin        Outpatient Medications Marked as Taking for the 24 encounter (Office Visit) with Estuardo Soriano,    Medication Sig Dispense Refill    amLODIPine (NORVASC) 5 MG tablet Take 5 mg by mouth once daily.      clonazePAM (KLONOPIN) 0.5 MG tablet TAKE ONE TABLET BY MOUTH DAILY EVERY EVENING 30 tablet 5    ELIQUIS 5 mg Tab Take 5 mg by mouth 2 (two) times daily.      furosemide (LASIX) 40 MG tablet Take 1 tablet (40 mg total) by mouth 2 (two) times a day. 90 tablet 1    hydrALAZINE (APRESOLINE) 50 MG tablet Take 2 tablets (100 mg  total) by mouth every 12 (twelve) hours. 120 tablet 11    losartan (COZAAR) 100 MG tablet TAKE ONE TABLET BY MOUTH DAILY FOR BLOOD PRESSURE 90 tablet 1    metoprolol succinate (TOPROL-XL) 25 MG 24 hr tablet Take 25 mg by mouth nightly.         Social History     Socioeconomic History    Marital status:    Tobacco Use    Smoking status: Never    Smokeless tobacco: Never   Substance and Sexual Activity    Alcohol use: Never    Drug use: Never    Sexual activity: Not Currently     Social Determinants of Health     Financial Resource Strain: Medium Risk (8/22/2024)    Overall Financial Resource Strain (CARDIA)     Difficulty of Paying Living Expenses: Somewhat hard   Food Insecurity: Food Insecurity Present (8/22/2024)    Hunger Vital Sign     Worried About Running Out of Food in the Last Year: Sometimes true     Ran Out of Food in the Last Year: Sometimes true   Transportation Needs: Patient Unable To Answer (2/2/2024)    PRAPARE - Transportation     Lack of Transportation (Medical): Patient unable to answer     Lack of Transportation (Non-Medical): Patient unable to answer   Physical Activity: Inactive (8/22/2024)    Exercise Vital Sign     Days of Exercise per Week: 0 days     Minutes of Exercise per Session: 0 min   Stress: Stress Concern Present (8/22/2024)    [employer] - Occupational Stress Questionnaire     Feeling of Stress : To some extent   Housing Stability: Unknown (8/22/2024)    Housing Stability Vital Sign     Unable to Pay for Housing in the Last Year: No        Family History   Problem Relation Name Age of Onset    Heart failure Mother      Parkinsonism Father      Valvular heart disease Sister          infant - cause of death        Patient Care Team:  Estuardo Soriano DO as PCP - General (Family Medicine)  Salbador Scott Jr., MD as Consulting Physician (Ophthalmology)  Daniele Marie MD as Consulting Physician (Cardiology)  Jose Yadav MD as  "Consulting Physician (Vascular Surgery)  Mauricio Horn MD as Consulting Physician (Cardiology)  [facility] - ([facility])     Subjective:     Review of Systems   Constitutional:  Negative for chills and fever.   Respiratory:  Negative for shortness of breath.    Cardiovascular:  Negative for chest pain.   Gastrointestinal:  Negative for constipation and diarrhea.   Musculoskeletal:  Positive for back pain.   Neurological:  Negative for dizziness and headaches.   Psychiatric/Behavioral:  The patient does not have insomnia.        See HPI for details  All Other ROS: Negative except as stated in HPI.       Objective:     BP (!) 144/78 (BP Location: Left arm, Patient Position: Sitting, BP Method: Large (Manual))   Pulse 82   Temp 97.4 °F (36.3 °C)   Resp 18   Ht 5' 3" (1.6 m)   Wt 115.2 kg (254 lb)   SpO2 95%   BMI 44.99 kg/m²     Physical Exam  Vitals reviewed.   Constitutional:       General: She is not in acute distress.     Appearance: Normal appearance. She is obese.   Cardiovascular:      Rate and Rhythm: Normal rate and regular rhythm.      Heart sounds: No murmur heard.     No friction rub. No gallop.   Pulmonary:      Effort: No respiratory distress.      Breath sounds: No wheezing, rhonchi or rales.   Musculoskeletal:         General: No swelling, tenderness or deformity.      Right lower leg: No edema.      Left lower leg: No edema.   Skin:     General: Skin is warm and dry.      Findings: No lesion or rash.   Neurological:      General: No focal deficit present.      Mental Status: She is alert.   Psychiatric:         Mood and Affect: Mood normal.         Assessment/Plan:     1. Chronic midline low back pain with bilateral sciatica  -     Urinalysis, Reflex to Urine Culture; Future; Expected date: 08/29/2024    2. Chronic kidney disease, stage 3a  -     Urinalysis, Reflex to Urine Culture; Future; Expected date: 08/29/2024  -     Comprehensive Metabolic Panel; Future; " Expected date: 08/29/2024    3. Essential hypertension  -     Comprehensive Metabolic Panel; Future; Expected date: 08/29/2024    Mildly elevated today. Permissive hypertensive due to advanced age.       Follow up:     Follow up in about 6 months (around 2/28/2025) for Medicare Wellness. In addition to their scheduled follow up, the patient has also been instructed to follow up on as needed basis.

## 2024-09-04 ENCOUNTER — TELEPHONE (OUTPATIENT)
Dept: FAMILY MEDICINE | Facility: CLINIC | Age: 84
End: 2024-09-04
Payer: MEDICARE

## 2024-09-05 ENCOUNTER — TELEPHONE (OUTPATIENT)
Dept: FAMILY MEDICINE | Facility: CLINIC | Age: 84
End: 2024-09-05
Payer: MEDICARE

## 2024-09-05 NOTE — TELEPHONE ENCOUNTER
----- Message from Estuardo Soriano DO sent at 9/5/2024  9:36 AM CDT -----  Please inform patient of lab results.    1. Urine negative for UTI    2. Renal function is a little decreased.     3. Other labwork within acceptable ranges.    Thanks,    Dr. Soriano

## 2024-10-11 DIAGNOSIS — I10 ESSENTIAL HYPERTENSION: ICD-10-CM

## 2024-10-11 DIAGNOSIS — G47.00 INSOMNIA, UNSPECIFIED TYPE: Chronic | ICD-10-CM

## 2024-10-12 RX ORDER — CLONAZEPAM 0.5 MG/1
TABLET ORAL
Qty: 30 TABLET | Refills: 2 | Status: SHIPPED | OUTPATIENT
Start: 2024-10-12 | End: 2025-01-10

## 2024-10-12 RX ORDER — LOSARTAN POTASSIUM 100 MG/1
TABLET ORAL
Qty: 90 TABLET | Refills: 1 | Status: SHIPPED | OUTPATIENT
Start: 2024-10-12

## 2024-11-04 ENCOUNTER — OFFICE VISIT (OUTPATIENT)
Dept: FAMILY MEDICINE | Facility: CLINIC | Age: 84
End: 2024-11-04
Payer: MEDICARE

## 2024-11-04 VITALS
HEIGHT: 63 IN | SYSTOLIC BLOOD PRESSURE: 160 MMHG | BODY MASS INDEX: 45.75 KG/M2 | OXYGEN SATURATION: 93 % | RESPIRATION RATE: 20 BRPM | HEART RATE: 78 BPM | TEMPERATURE: 98 F | WEIGHT: 258.19 LBS | DIASTOLIC BLOOD PRESSURE: 75 MMHG

## 2024-11-04 DIAGNOSIS — R26.81 UNSTABLE GAIT: ICD-10-CM

## 2024-11-04 DIAGNOSIS — M76.62 LEFT ACHILLES TENDINITIS: Primary | ICD-10-CM

## 2024-11-04 PROCEDURE — 1101F PT FALLS ASSESS-DOCD LE1/YR: CPT | Mod: CPTII,,,

## 2024-11-04 PROCEDURE — 1125F AMNT PAIN NOTED PAIN PRSNT: CPT | Mod: CPTII,,,

## 2024-11-04 PROCEDURE — 1159F MED LIST DOCD IN RCRD: CPT | Mod: CPTII,,,

## 2024-11-04 PROCEDURE — 3288F FALL RISK ASSESSMENT DOCD: CPT | Mod: CPTII,,,

## 2024-11-04 PROCEDURE — 1160F RVW MEDS BY RX/DR IN RCRD: CPT | Mod: CPTII,,,

## 2024-11-04 PROCEDURE — 99213 OFFICE O/P EST LOW 20 MIN: CPT | Mod: ,,,

## 2024-11-04 RX ORDER — HYDRALAZINE HYDROCHLORIDE 25 MG/1
25 TABLET, FILM COATED ORAL 3 TIMES DAILY
COMMUNITY
Start: 2024-09-09

## 2024-11-04 RX ORDER — HYDRALAZINE HYDROCHLORIDE 50 MG/1
100 TABLET, FILM COATED ORAL EVERY 12 HOURS
Qty: 120 TABLET | Refills: 5 | Status: CANCELLED | OUTPATIENT
Start: 2024-11-04 | End: 2025-05-03

## 2024-11-04 NOTE — PROGRESS NOTES
Patient ID: [id number]     Chief Complaint: left calf pain (Burning and swelling about ankle/Increase pulling pain when walking)    HPI:     Gwendolyn Trejo, an 84-year-old woman, is here today for a follow-up appointment. She is experiencing pain in her lower leg near the tibia and Achilles tendon, along with a painful lump in that area. She reports no history of falls or trauma. For pain relief, she has been using over-the-counter Tylenol Arthritis. She has no additional complaints at this time.    Past Medical History:   Diagnosis Date    Anemia of chronic disease     Anxiety disorder     Aortic stenosis     Atrial fibrillation     CAD (coronary artery disease)     Congestive heart failure (CHF)     Coronary arteriosclerosis     Crushing injury of right foot     Diastolic dysfunction     Diverticulosis     History of Coumadin therapy     History of transient ischemic attack (TIA)     HTN (hypertension)     Hypoxia     JOAQUIN (iron deficiency anemia)     Inflamed seborrheic keratosis     Insomnia     Lumbar radiculopathy     Mitral regurgitation     Neovascular age-related macular degeneration     Nodular calcific aortic valve stenosis     Pseudophakia of both eyes     Renal scarring     S/P TAVR (transcatheter aortic valve replacement) 2023    Sciatica     Severe aortic stenosis     Transient cerebral ischemia         Past Surgical History:   Procedure Laterality Date    ANKLE GANGLION CYST EXCISION Left     CARDIAC CATHETERIZATION  2013    Dr Yadav    CATARACT EXTRACTION Bilateral      SECTION       SECTION      TRANSCATHETER AORTIC VALVE REPLACEMENT (TAVR)  2023    Dr Mauricio Horn        Social History     Socioeconomic History    Marital status:    Tobacco Use    Smoking status: Never    Smokeless tobacco: Never   Substance and Sexual Activity    Alcohol use: Never    Drug use: Never    Sexual activity: Not Currently     Social Drivers of Health     Financial Resource  Strain: Medium Risk (8/22/2024)    Overall Financial Resource Strain (CARDIA)     Difficulty of Paying Living Expenses: Somewhat hard   Food Insecurity: Food Insecurity Present (8/22/2024)    Hunger Vital Sign     Worried About Running Out of Food in the Last Year: Sometimes true     Ran Out of Food in the Last Year: Sometimes true   Transportation Needs: Patient Unable To Answer (2/2/2024)    PRAPARE - Transportation     Lack of Transportation (Medical): Patient unable to answer     Lack of Transportation (Non-Medical): Patient unable to answer   Physical Activity: Inactive (8/22/2024)    Exercise Vital Sign     Days of Exercise per Week: 0 days     Minutes of Exercise per Session: 0 min   Stress: Stress Concern Present (8/22/2024)    [employer] - Occupational Stress Questionnaire     Feeling of Stress : To some extent   Housing Stability: Unknown (8/22/2024)    Housing Stability Vital Sign     Unable to Pay for Housing in the Last Year: No        Current Outpatient Medications   Medication Instructions    amLODIPine (NORVASC) 5 mg, Daily    clonazePAM (KLONOPIN) 0.5 MG tablet TAKE ONE TABLET BY MOUTH DAILY EVERY EVENING    ELIQUIS 5 mg, 2 times daily    furosemide (LASIX) 40 mg, Oral, 2 times daily    hydrALAZINE (APRESOLINE) 25 mg, 3 times daily    losartan (COZAAR) 100 MG tablet TAKE ONE TABLET BY MOUTH DAILY FOR BLOOD PRESSURE    metoprolol succinate (TOPROL-XL) 25 mg, Nightly       Review of patient's allergies indicates:   Allergen Reactions    Penicillins Hives    Penicillin         Patient Care Team:  Estuardo Soriano DO as PCP - General (Family Medicine)  Salbador Scott Jr., MD as Consulting Physician (Ophthalmology)  Daniele Marie MD as Consulting Physician (Cardiology)  Jose Yadav MD as Consulting Physician (Vascular Surgery)  Mauricio Horn MD as Consulting Physician (Cardiology)  [facility] - ([facility])     Subjective:  "    Review of Systems    12 point review of systems conducted, negative except as stated in the history of present illness. See HPI for details.    Objective:     Visit Vitals  BP (!) 160/75 (BP Location: Left arm, Patient Position: Sitting)   Pulse 78   Temp 97.9 °F (36.6 °C)   Resp 20   Ht 5' 3" (1.6 m)   Wt 117.1 kg (258 lb 3.2 oz)   SpO2 (!) 93%   BMI 45.74 kg/m²       Physical Exam  Vitals and nursing note reviewed.   Constitutional:       General: She is not in acute distress.     Appearance: She is not ill-appearing.   HENT:      Head: Normocephalic and atraumatic.      Mouth/Throat:      Mouth: Mucous membranes are moist.      Pharynx: Oropharynx is clear.   Eyes:      General: No scleral icterus.     Extraocular Movements: Extraocular movements intact.      Conjunctiva/sclera: Conjunctivae normal.      Pupils: Pupils are equal, round, and reactive to light.   Neck:      Vascular: No carotid bruit.   Cardiovascular:      Rate and Rhythm: Normal rate and regular rhythm.      Heart sounds: No murmur heard.     No friction rub. No gallop.   Pulmonary:      Effort: Pulmonary effort is normal. No respiratory distress.      Breath sounds: Normal breath sounds. No wheezing, rhonchi or rales.   Abdominal:      General: Abdomen is flat. Bowel sounds are normal. There is no distension.      Palpations: Abdomen is soft. There is no mass.      Tenderness: There is no abdominal tenderness.   Musculoskeletal:         General: Normal range of motion.      Cervical back: Normal range of motion and neck supple.      Right ankle:      Right Achilles Tendon: Normal.      Left ankle:      Left Achilles Tendon: Tenderness and defect present.   Skin:     General: Skin is warm and dry.   Neurological:      General: No focal deficit present.      Mental Status: She is alert.   Psychiatric:         Mood and Affect: Mood normal.       Labs Reviewed:   Chemistry:  Lab Results   Component Value Date     08/29/2024    K 4.3 " 08/29/2024    BUN 26.0 (H) 08/29/2024    CREATININE 1.15 (H) 08/29/2024    EGFRNORACEVR 46 08/29/2024    GLUCOSE 117 (H) 08/29/2024    CALCIUM 10.0 08/29/2024    ALKPHOS 67 08/29/2024    LABPROT 8.3 (H) 08/29/2024    ALBUMIN 4.3 08/29/2024    BILIDIR 0.4 12/22/2020    IBILI 0.60 12/22/2020    AST 15 08/29/2024    ALT 14 08/29/2024    MG 2.60 02/01/2024    EIWGLUCO69NW 26.4 (L) 01/23/2024    TSH 1.958 01/23/2024      Hematology:  Lab Results   Component Value Date    WBC 6.37 05/16/2024    HGB 12.2 05/16/2024    HCT 38.6 05/16/2024     05/16/2024       Lipid Panel:  Lab Results   Component Value Date    CHOL 152 01/23/2024    HDL 59 01/23/2024    LDL 80.00 01/23/2024    TRIG 63 01/23/2024    TOTALCHOLEST 3 01/23/2024        Urine:  Lab Results   Component Value Date    APPEARANCEUA Clear 08/29/2024    SGUA 1.010 08/29/2024    PROTEINUA Negative 08/29/2024    KETONESUA Negative 08/29/2024    LEUKOCYTESUR Negative 08/29/2024    RBCUA 0-2 03/17/2023    WBCUA 3-5 03/17/2023    BACTERIA Rare 03/17/2023     Assessment:       ICD-10-CM ICD-9-CM   1. Left Achilles tendinitis  M76.62 726.71      Plan:     1. Left Achilles tendinitis  -     Ambulatory referral/consult to Orthopedics; Future; Expected date: 11/11/2024    Discussed Ortho referral over the phone with Dee patient's daughter.     Follow up if symptoms worsen or fail to improve. In addition to their scheduled follow up, the patient has also been instructed to follow up on as needed basis.     Future Appointments   Date Time Provider Department Center   3/3/2025  9:20 AM Sparkle Espinosa NP [facility]      Sparkle Espinosa NP

## 2024-11-06 ENCOUNTER — TELEPHONE (OUTPATIENT)
Dept: FAMILY MEDICINE | Facility: CLINIC | Age: 84
End: 2024-11-06
Payer: MEDICARE

## 2024-11-06 ENCOUNTER — HOSPITAL ENCOUNTER (EMERGENCY)
Facility: HOSPITAL | Age: 84
Discharge: HOME | End: 2024-11-06
Attending: FAMILY MEDICINE
Payer: MEDICARE

## 2024-11-06 VITALS
HEIGHT: 63 IN | SYSTOLIC BLOOD PRESSURE: 172 MMHG | HEART RATE: 85 BPM | WEIGHT: 258 LBS | TEMPERATURE: 99 F | DIASTOLIC BLOOD PRESSURE: 92 MMHG | BODY MASS INDEX: 45.71 KG/M2 | RESPIRATION RATE: 18 BRPM | OXYGEN SATURATION: 96 %

## 2024-11-06 DIAGNOSIS — M76.62 ACHILLES TENDINITIS OF LEFT LOWER EXTREMITY: ICD-10-CM

## 2024-11-06 DIAGNOSIS — S86.312A STRAIN OF MUSCLE(S) AND TENDON(S) OF PERONEAL MUSCLE GROUP AT LOWER LEG LEVEL, LEFT LEG, INITIAL ENCOUNTER: Primary | ICD-10-CM

## 2024-11-06 DIAGNOSIS — R60.9 SWELLING: ICD-10-CM

## 2024-11-06 DIAGNOSIS — R52 PAIN: ICD-10-CM

## 2024-11-06 PROCEDURE — 25000003 PHARM REV CODE 250: Performed by: FAMILY MEDICINE

## 2024-11-06 PROCEDURE — 99284 EMERGENCY DEPT VISIT MOD MDM: CPT | Mod: 25

## 2024-11-06 RX ORDER — HYDROCODONE BITARTRATE AND ACETAMINOPHEN 5; 325 MG/1; MG/1
1 TABLET ORAL EVERY 6 HOURS PRN
Qty: 12 TABLET | Refills: 0 | Status: SHIPPED | OUTPATIENT
Start: 2024-11-06 | End: 2024-11-06

## 2024-11-06 RX ORDER — HYDROCODONE BITARTRATE AND ACETAMINOPHEN 5; 325 MG/1; MG/1
1 TABLET ORAL EVERY 6 HOURS PRN
Qty: 15 TABLET | Refills: 0 | Status: SHIPPED | OUTPATIENT
Start: 2024-11-06

## 2024-11-06 RX ORDER — HYDROCODONE BITARTRATE AND ACETAMINOPHEN 5; 325 MG/1; MG/1
1 TABLET ORAL
Status: COMPLETED | OUTPATIENT
Start: 2024-11-06 | End: 2024-11-06

## 2024-11-06 RX ADMIN — HYDROCODONE BITARTRATE AND ACETAMINOPHEN 1 TABLET: 5; 325 TABLET ORAL at 08:11

## 2024-11-06 NOTE — ED PROVIDER NOTES
Encounter Date: 2024       History     Chief Complaint   Patient presents with    Leg Pain     Left lower leg pain, states felt a pop when walking in her house.  Seen Monday at PCP for behind lower left leg pain.       This patient is an 84-year-old female who comes in with level left lower extremity pain and swelling for the past week.  Patient states that the posterior part of her left ankle was hurting her and she went to her PCP who told her that she had Achilles tendinitis.  Today, when she was walking inside the house to go get her mail she felt a pop left lower extremity and was unable to walk after that.  Patient has swelling that is notable in the left ankle area    The history is provided by the patient.     Review of patient's allergies indicates:   Allergen Reactions    Penicillins Hives    Penicillin      Past Medical History:   Diagnosis Date    Anemia of chronic disease     Anxiety disorder     Aortic stenosis     Atrial fibrillation     CAD (coronary artery disease)     Congestive heart failure (CHF)     Coronary arteriosclerosis     Crushing injury of right foot     Diastolic dysfunction     Diverticulosis     History of Coumadin therapy     History of transient ischemic attack (TIA)     HTN (hypertension)     Hypoxia     OJAQUIN (iron deficiency anemia)     Inflamed seborrheic keratosis     Insomnia     Lumbar radiculopathy     Mitral regurgitation     Neovascular age-related macular degeneration     Nodular calcific aortic valve stenosis     Pseudophakia of both eyes     Renal scarring     S/P TAVR (transcatheter aortic valve replacement) 2023    Sciatica     Severe aortic stenosis     Transient cerebral ischemia      Past Surgical History:   Procedure Laterality Date    ANKLE GANGLION CYST EXCISION Left     CARDIAC CATHETERIZATION  2013    Dr Yadav    CATARACT EXTRACTION Bilateral      SECTION       SECTION      TRANSCATHETER AORTIC VALVE REPLACEMENT (TAVR)   04/20/2023    Dr Mauricio Horn     Family History   Problem Relation Name Age of Onset    Heart failure Mother      Parkinsonism Father      Valvular heart disease Sister          infant - cause of death     Social History     Tobacco Use    Smoking status: Never    Smokeless tobacco: Never   Substance Use Topics    Alcohol use: Never    Drug use: Never     Review of Systems   Constitutional: Negative.    HENT: Negative.     Respiratory: Negative.     Cardiovascular: Negative.    Endocrine: Negative.    Musculoskeletal:         Left lower extremity pain and swelling   Neurological: Negative.    Psychiatric/Behavioral: Negative.     All other systems reviewed and are negative.      Physical Exam     Initial Vitals   BP Pulse Resp Temp SpO2   11/06/24 0829 11/06/24 0829 11/06/24 0829 11/06/24 0834 11/06/24 0829   (!) 172/92 85 20 98.5 °F (36.9 °C) 96 %      MAP       --                Physical Exam    Nursing note and vitals reviewed.  Constitutional: She appears well-developed.   HENT:   Head: Normocephalic.   Eyes: Pupils are equal, round, and reactive to light.   Neck:   Normal range of motion.  Cardiovascular:  Normal rate, regular rhythm and normal heart sounds.           Pulmonary/Chest: Breath sounds normal.   Abdominal: Abdomen is soft.   Musculoskeletal:         General: Normal range of motion.      Cervical back: Normal range of motion.        Legs:       Comments: Pain and swelling left lower extremity mostly in the posterior part of the left shin but also in the left knee     Neurological: She is alert and oriented to person, place, and time.   Skin: Skin is warm and dry.   Psychiatric: She has a normal mood and affect.         ED Course   Procedures  Labs Reviewed - No data to display       Imaging Results              US Lower Extremity Veins Left (Final result)  Result time 11/06/24 10:17:35      Final result by Jean-Pierre Whitley MD (11/06/24 10:17:35)                   Impression:      No deep venous  thrombosis in the left lower extremity.      Electronically signed by: Jean-Pierre Whitley  Date:    11/06/2024  Time:    10:17               Narrative:    EXAMINATION:  US LOWER EXTREMITY VEINS LEFT    CLINICAL HISTORY:  Edema, unspecified    TECHNIQUE:  Duplex and color flow Doppler evaluation of the major left lower extremity veins.    COMPARISON:  None    FINDINGS:  The left common femoral, femoral and popliteal veins are compressible. No thrombus seen in these vessels on gray-scale and color Doppler evaluation. The left posterior tibial, anterior tibial and peroneal veins are patent with no thrombus identified.                                       X-Ray Tibia Fibula 2 View Left (Final result)  Result time 11/06/24 09:06:40      Final result by Bhavik Buchanan MD (11/06/24 09:06:40)                   Impression:      As above.      Electronically signed by: Bhavik Buchanan  Date:    11/06/2024  Time:    09:06               Narrative:    EXAMINATION:  XR TIBIA FIBULA 2 VIEW LEFT    CLINICAL HISTORY:  Pain, unspecified    TECHNIQUE:  Two views of the left tibia and fibula.    COMPARISON:  No prior imaging available for comparison    FINDINGS:  No displaced fracture.  Degenerative changes at the knee and the ankle with osteophyte formation.  Soft tissue edema is noted.                                       Medications   HYDROcodone-acetaminophen 5-325 mg per tablet 1 tablet (1 tablet Oral Given 11/6/24 0849)     Medical Decision Making  This patient is an 84-year-old female who comes in with left lower extremity pain and swelling.  She denies any trauma  Differential diagnosis: DVT, Achilles tendinitis, left legs brain    Amount and/or Complexity of Data Reviewed  Radiology: ordered.     Details: X-ray done on the left tib-fib shows significant degenerative changes especially in the left knee but no acute pathology.  Ultrasound the veins of the left lower extremity shows no deep vein thrombus    Risk  Prescription  drug management.                                      Clinical Impression:  Final diagnoses:  [R60.9] Swelling  [R52] Pain  [S86.312A] Strain of muscle(s) and tendon(s) of peroneal muscle group at lower leg level, left leg, initial encounter (Primary)  [M76.62] Achilles tendinitis of left lower extremity          ED Disposition Condition    Discharge Stable          ED Prescriptions       Medication Sig Dispense Start Date End Date Auth. Provider    HYDROcodone-acetaminophen (NORCO) 5-325 mg per tablet  (Status: Discontinued) Take 1 tablet by mouth every 6 (six) hours as needed for Pain. 12 tablet 11/6/2024 11/6/2024 Esther Magana MD    HYDROcodone-acetaminophen (NORCO) 5-325 mg per tablet Take 1 tablet by mouth every 6 (six) hours as needed for Pain. 15 tablet 11/6/2024 -- Esther Magana MD          Follow-up Information       Follow up With Specialties Details Why Contact Info    Estuardo Soriano,  Family Medicine Schedule an appointment as soon as possible for a visit in 3 days  [address] [facility] [address]  [phone]               Esther Magana MD  11/06/24 0220

## 2024-11-09 DIAGNOSIS — I10 ESSENTIAL HYPERTENSION: ICD-10-CM

## 2024-11-11 ENCOUNTER — TELEPHONE (OUTPATIENT)
Dept: FAMILY MEDICINE | Facility: CLINIC | Age: 84
End: 2024-11-11
Payer: MEDICARE

## 2024-11-11 RX ORDER — FUROSEMIDE 40 MG/1
TABLET ORAL
Qty: 90 TABLET | Refills: 1 | Status: SHIPPED | OUTPATIENT
Start: 2024-11-11

## 2025-01-06 DIAGNOSIS — G47.00 INSOMNIA, UNSPECIFIED TYPE: Chronic | ICD-10-CM

## 2025-01-06 DIAGNOSIS — I10 ESSENTIAL HYPERTENSION: ICD-10-CM

## 2025-01-06 RX ORDER — CLONAZEPAM 0.5 MG/1
TABLET ORAL
Qty: 30 TABLET | Refills: 2 | Status: SHIPPED | OUTPATIENT
Start: 2025-01-06 | End: 2025-04-06

## 2025-01-06 RX ORDER — FUROSEMIDE 40 MG/1
TABLET ORAL
Qty: 90 TABLET | Refills: 1 | Status: SHIPPED | OUTPATIENT
Start: 2025-01-06

## 2025-02-18 DIAGNOSIS — Z00.00 WELLNESS EXAMINATION: Primary | ICD-10-CM

## 2025-02-18 DIAGNOSIS — I10 ESSENTIAL HYPERTENSION: ICD-10-CM

## 2025-02-18 DIAGNOSIS — N18.31 CHRONIC KIDNEY DISEASE, STAGE 3A: ICD-10-CM

## 2025-02-18 DIAGNOSIS — D63.8 ANEMIA OF CHRONIC DISEASE: ICD-10-CM

## 2025-02-18 DIAGNOSIS — I21.A9 OTHER MYOCARDIAL INFARCTION TYPE: ICD-10-CM

## 2025-03-03 ENCOUNTER — OFFICE VISIT (OUTPATIENT)
Dept: FAMILY MEDICINE | Facility: CLINIC | Age: 85
End: 2025-03-03
Payer: MEDICARE

## 2025-03-03 ENCOUNTER — LAB VISIT (OUTPATIENT)
Dept: LAB | Facility: HOSPITAL | Age: 85
End: 2025-03-03
Attending: FAMILY MEDICINE
Payer: MEDICARE

## 2025-03-03 VITALS
OXYGEN SATURATION: 96 % | SYSTOLIC BLOOD PRESSURE: 139 MMHG | TEMPERATURE: 98 F | BODY MASS INDEX: 46.28 KG/M2 | RESPIRATION RATE: 20 BRPM | WEIGHT: 261.19 LBS | DIASTOLIC BLOOD PRESSURE: 76 MMHG | HEIGHT: 63 IN | HEART RATE: 86 BPM

## 2025-03-03 DIAGNOSIS — N18.2 STAGE 2 CHRONIC KIDNEY DISEASE: ICD-10-CM

## 2025-03-03 DIAGNOSIS — R53.81 PHYSICAL DECONDITIONING: ICD-10-CM

## 2025-03-03 DIAGNOSIS — E66.813 CLASS 3 SEVERE OBESITY DUE TO EXCESS CALORIES WITH SERIOUS COMORBIDITY AND BODY MASS INDEX (BMI) OF 45.0 TO 49.9 IN ADULT: ICD-10-CM

## 2025-03-03 DIAGNOSIS — I21.A9 OTHER MYOCARDIAL INFARCTION TYPE: ICD-10-CM

## 2025-03-03 DIAGNOSIS — I10 ESSENTIAL HYPERTENSION: ICD-10-CM

## 2025-03-03 DIAGNOSIS — I50.33 ACUTE ON CHRONIC HEART FAILURE WITH PRESERVED EJECTION FRACTION (HFPEF): ICD-10-CM

## 2025-03-03 DIAGNOSIS — D63.8 ANEMIA OF CHRONIC DISEASE: ICD-10-CM

## 2025-03-03 DIAGNOSIS — Z91.81 HISTORY OF FALL: ICD-10-CM

## 2025-03-03 DIAGNOSIS — Z00.00 WELLNESS EXAMINATION: ICD-10-CM

## 2025-03-03 DIAGNOSIS — I48.0 PAROXYSMAL ATRIAL FIBRILLATION: ICD-10-CM

## 2025-03-03 DIAGNOSIS — N18.31 CHRONIC KIDNEY DISEASE, STAGE 3A: ICD-10-CM

## 2025-03-03 DIAGNOSIS — E66.01 CLASS 3 SEVERE OBESITY DUE TO EXCESS CALORIES WITH SERIOUS COMORBIDITY AND BODY MASS INDEX (BMI) OF 45.0 TO 49.9 IN ADULT: ICD-10-CM

## 2025-03-03 DIAGNOSIS — Z00.00 MEDICARE ANNUAL WELLNESS VISIT, SUBSEQUENT: Primary | ICD-10-CM

## 2025-03-03 DIAGNOSIS — G47.00 INSOMNIA, UNSPECIFIED TYPE: Chronic | ICD-10-CM

## 2025-03-03 LAB
ALBUMIN SERPL-MCNC: 4 G/DL (ref 3.4–4.8)
ALBUMIN/GLOB SERPL: 1 RATIO (ref 1.1–2)
ALP SERPL-CCNC: 51 UNIT/L (ref 40–150)
ALT SERPL-CCNC: 12 UNIT/L (ref 0–55)
ANION GAP SERPL CALC-SCNC: 8 MEQ/L
AST SERPL-CCNC: 15 UNIT/L (ref 5–34)
BASOPHILS # BLD AUTO: 0.04 X10(3)/MCL
BASOPHILS NFR BLD AUTO: 0.7 %
BILIRUB SERPL-MCNC: 0.9 MG/DL
BUN SERPL-MCNC: 25 MG/DL (ref 9.8–20.1)
CALCIUM SERPL-MCNC: 9.6 MG/DL (ref 8.4–10.2)
CHLORIDE SERPL-SCNC: 104 MMOL/L (ref 98–107)
CHOLEST SERPL-MCNC: 189 MG/DL
CHOLEST/HDLC SERPL: 3 {RATIO} (ref 0–5)
CO2 SERPL-SCNC: 30 MMOL/L (ref 23–31)
CREAT SERPL-MCNC: 0.92 MG/DL (ref 0.55–1.02)
CREAT/UREA NIT SERPL: 27
EOSINOPHIL # BLD AUTO: 0.18 X10(3)/MCL (ref 0–0.9)
EOSINOPHIL NFR BLD AUTO: 3.2 %
ERYTHROCYTE [DISTWIDTH] IN BLOOD BY AUTOMATED COUNT: 14.4 % (ref 11.5–17)
EST. AVERAGE GLUCOSE BLD GHB EST-MCNC: 116.9 MG/DL
GFR SERPLBLD CREATININE-BSD FMLA CKD-EPI: >60 ML/MIN/1.73/M2
GLOBULIN SER-MCNC: 3.9 GM/DL (ref 2.4–3.5)
GLUCOSE SERPL-MCNC: 121 MG/DL (ref 82–115)
HBA1C MFR BLD: 5.7 %
HCT VFR BLD AUTO: 40.1 % (ref 37–47)
HDLC SERPL-MCNC: 65 MG/DL (ref 35–60)
HGB BLD-MCNC: 12.4 G/DL (ref 12–16)
IMM GRANULOCYTES # BLD AUTO: 0.02 X10(3)/MCL (ref 0–0.04)
IMM GRANULOCYTES NFR BLD AUTO: 0.4 %
LDLC SERPL CALC-MCNC: 108 MG/DL (ref 50–140)
LYMPHOCYTES # BLD AUTO: 1.15 X10(3)/MCL (ref 0.6–4.6)
LYMPHOCYTES NFR BLD AUTO: 20.3 %
MCH RBC QN AUTO: 27.4 PG (ref 27–31)
MCHC RBC AUTO-ENTMCNC: 30.9 G/DL (ref 33–36)
MCV RBC AUTO: 88.5 FL (ref 80–94)
MONOCYTES # BLD AUTO: 0.54 X10(3)/MCL (ref 0.1–1.3)
MONOCYTES NFR BLD AUTO: 9.5 %
NEUTROPHILS # BLD AUTO: 3.73 X10(3)/MCL (ref 2.1–9.2)
NEUTROPHILS NFR BLD AUTO: 65.9 %
NRBC BLD AUTO-RTO: 0 %
PLATELET # BLD AUTO: 190 X10(3)/MCL (ref 130–400)
PMV BLD AUTO: 10.8 FL (ref 7.4–10.4)
POTASSIUM SERPL-SCNC: 4.2 MMOL/L (ref 3.5–5.1)
PROT SERPL-MCNC: 7.9 GM/DL (ref 5.8–7.6)
RBC # BLD AUTO: 4.53 X10(6)/MCL (ref 4.2–5.4)
SODIUM SERPL-SCNC: 142 MMOL/L (ref 136–145)
TRIGL SERPL-MCNC: 78 MG/DL (ref 37–140)
TSH SERPL-ACNC: 1.57 UIU/ML (ref 0.35–4.94)
VLDLC SERPL CALC-MCNC: 16 MG/DL
WBC # BLD AUTO: 5.66 X10(3)/MCL (ref 4.5–11.5)

## 2025-03-03 PROCEDURE — 83036 HEMOGLOBIN GLYCOSYLATED A1C: CPT

## 2025-03-03 PROCEDURE — 85025 COMPLETE CBC W/AUTO DIFF WBC: CPT

## 2025-03-03 PROCEDURE — 80061 LIPID PANEL: CPT

## 2025-03-03 PROCEDURE — 80053 COMPREHEN METABOLIC PANEL: CPT

## 2025-03-03 PROCEDURE — 84443 ASSAY THYROID STIM HORMONE: CPT

## 2025-03-03 PROCEDURE — 36415 COLL VENOUS BLD VENIPUNCTURE: CPT

## 2025-03-03 RX ORDER — CLONAZEPAM 0.5 MG/1
0.5 TABLET ORAL NIGHTLY
Qty: 30 TABLET | Refills: 2 | Status: SHIPPED | OUTPATIENT
Start: 2025-03-03 | End: 2025-06-01

## 2025-03-03 RX ORDER — LOSARTAN POTASSIUM 100 MG/1
100 TABLET ORAL DAILY
Qty: 90 TABLET | Refills: 1 | Status: SHIPPED | OUTPATIENT
Start: 2025-03-03

## 2025-03-03 NOTE — ASSESSMENT & PLAN NOTE
Well Controlled.   Continue Losartan 100 mg daily + Hydralazine 25 mg TID.    Low Sodium Diet (DASH Diet - Less than 2 grams of sodium per day).  Monitor blood pressure daily and log. Report consistent numbers greater than 140/90.  Maintain healthy weight with goal BMI <30.

## 2025-03-03 NOTE — ASSESSMENT & PLAN NOTE
Monitor patient's weight, currently 261 lbs, an increase from 252 lbs.  Discuss obesity management and dietary recommendations, focusing on a balanced diet with portion control.  Emphasize protein, vegetables, and limited carbohydrates to manage calorie intake and weight.  - Importance of eating regularly despite weight concerns - Benefits of a Mediterranean-style diet  Gwendolyn to adhere to a low-salt diet as discussed.

## 2025-03-03 NOTE — PROGRESS NOTES
"     Primary Care    Gwendolyn Trejo is a 84 y.o. female here today for a Medicare Annual Wellness visit and comprehensive Health Risk Assessment.     RECENT FALL AND MOBILITY:  She recently experienced a fall outdoors, losing her balance between corn and a patio table. Following the fall, she developed soreness and knee pain. She now uses a walker for all ambulation.    CARDIOVASCULAR:  She has a history of heart failure managed with Lasix and Metoprolol. She also has atrial fibrillation but denies recent palpitations.    RENAL:  She has chronic kidney disease, which has improved from stage 3A to stage 2 with GFR now greater than 60.    DIET AND WEIGHT:  Her current weight is 261 lbs, increased from 252 lbs. Her typical breakfast consists of a biscuit and coffee. She monitors her salt intake and uses Mrs[employer] Clay seasoning instead of salt when cooking.    CURRENT MEDICATIONS:    She takes Losartan 100 mg, Lasix 40 mg, Klonopin, amlodipine, and metoprolol (taken nightly for heart failure management).         Subjective   The following components were reviewed and updated:  Medical history  Family History  Social history  Allergies  Current Medications  Immunizations  Health Maintenance  Patient Care Team    Review of Systems  A comprehensive review of systems was conducted and is negative except as noted above.     Objective   Visit Vitals  /76 (BP Location: Left arm, Patient Position: Sitting)   Pulse 86   Temp 97.9 °F (36.6 °C)   Resp 20   Ht 5' 3" (1.6 m)   Wt 118.5 kg (261 lb 3.2 oz)   SpO2 96%   BMI 46.27 kg/m²        Physical Exam  Vitals and nursing note reviewed.   Constitutional:       General: She is not in acute distress.     Appearance: She is not ill-appearing.   HENT:      Head: Normocephalic and atraumatic.      Mouth/Throat:      Mouth: Mucous membranes are moist.      Pharynx: Oropharynx is clear.   Eyes:      General: No scleral icterus.     Extraocular Movements: Extraocular movements intact.     "  Conjunctiva/sclera: Conjunctivae normal.      Pupils: Pupils are equal, round, and reactive to light.   Neck:      Vascular: No carotid bruit.   Cardiovascular:      Rate and Rhythm: Normal rate. Rhythm regularly irregular.      Heart sounds: No murmur heard.     No friction rub. No gallop.   Pulmonary:      Effort: Pulmonary effort is normal. No respiratory distress.      Breath sounds: Normal breath sounds. No wheezing, rhonchi or rales.   Abdominal:      General: Abdomen is flat. Bowel sounds are normal. There is no distension.      Palpations: Abdomen is soft. There is no mass.      Tenderness: There is no abdominal tenderness.   Musculoskeletal:         General: Normal range of motion.      Cervical back: Normal range of motion and neck supple.      Right lower leg: Edema present.      Left lower leg: Edema present.   Skin:     General: Skin is warm and dry.   Neurological:      General: No focal deficit present.      Mental Status: She is alert.   Psychiatric:         Mood and Affect: Mood normal.       Assessment/Plan:  1. Medicare annual wellness visit, subsequent    2. Acute on chronic heart failure with preserved ejection fraction (HFpEF)  Assessment & Plan:  Continue Lasix 40 mg BID + Metoprolol 25 mg nightly.   Last ECHO on  showed EF of 55-60 %.    Notify the clinic if you gain 3 or more pounds in one day or 5 or more pounds in one week.  Stressed importance of daily morning weights after urination but prior to breakfast on the same scale.  Low Sodium Diet (Dash Diet - less than 2 grams of sodium per day).  Follow a low cholesterol, low saturated fat diet with less that 200mg of cholesterol a day.  Cut down on alcohol if you have more than 1 drink a day (for women) or 2 drinks a day (for men).  Maintain healthy weight with goal BMI <30. Perform 30 minutes of daily physical activity 5 days per week.  Report to ER for chest pain, SOB, difficulty breathing, abdominal distention or significant edema to  lower extremities.  Continue following up with Cardiology - Dr. Marie & Dr. Brantley       3. Essential hypertension  Assessment & Plan:  Well Controlled.   Continue Losartan 100 mg daily + Hydralazine 25 mg TID.    Low Sodium Diet (DASH Diet - Less than 2 grams of sodium per day).  Monitor blood pressure daily and log. Report consistent numbers greater than 140/90.  Maintain healthy weight with goal BMI <30.       Orders:  -     losartan (COZAAR) 100 MG tablet; Take 1 tablet (100 mg total) by mouth once daily.  Dispense: 90 tablet; Refill: 1    4. Paroxysmal atrial fibrillation  Assessment & Plan:  Continue Eliquis 5 mg BID + metoprolol succinate XL 25 mg daily.      Monitor blood pressure daily and log. Report consistent numbers greater than 140/90.  Avoid caffeine and alcohol.   Call 911 and/or report to ER if you feel as if your heart is racing, have chest pain, dizziness, or trouble breathing.      5. Class 3 severe obesity due to excess calories with serious comorbidity and body mass index (BMI) of 45.0 to 49.9 in adult  Assessment & Plan:  Monitor patient's weight, currently 261 lbs, an increase from 252 lbs.  Discuss obesity management and dietary recommendations, focusing on a balanced diet with portion control.  Emphasize protein, vegetables, and limited carbohydrates to manage calorie intake and weight.  - Importance of eating regularly despite weight concerns - Benefits of a Mediterranean-style diet  Gwendolyn to adhere to a low-salt diet as discussed.           6. Stage 2 chronic kidney disease  Assessment & Plan:  Monitor kidney function through GFR measurements.  Previous GFR was 46 (stage 3A), while current GFR is greater than 60, indicating improvement to stage 2 chronic kidney disease.  Continue current management.     Lab Results   Component Value Date    EGFRNORACEVR >60 03/03/2025    EGFRNORACEVR 46 08/29/2024     Follow renoprotective measures including Renal Diet (reduce intake of nuts, peanut  butter, milk, cheese, dried beans, peas) and Low Sodium Diet (less than 2 grams per day).  Avoid NSAIDs (Aleve, Mobic, Celebrex, Ibuprofen, Advil, Toradol and Diclofenac). May take Tylenol as needed for headache/pain.  Control DM with goal A1C <7. BP goal <130/80. LDL goal < 100.  Stay well hydrated. Avoid alcohol and soda. Limit tea and coffee.      7. Physical deconditioning  -     Ambulatory referral/consult to Home Health; Future; Expected date: 03/03/2025    8. History of fall  -     Ambulatory referral/consult to Home Health; Future; Expected date: 03/03/2025    9. Insomnia, unspecified type  -     clonazePAM (KLONOPIN) 0.5 MG tablet; Take 1 tablet (0.5 mg total) by mouth every evening.  Dispense: 30 tablet; Refill: 2      A comprehensive HEALTH RISK ASSESSMENT was completed today. Results are summarized below:    There are NO EMOTIONAL/SOCIAL CONCERNS identified on today's screening for Social Isolation, Depression and Anxiety.    There are NO COGNITIVE FUNCTION CONCERNS identified on today's screening.  The following FUNCTIONAL AND/OR SAFETY CONCERNS were identified on today's screening for Physical Symptoms, Nutritional, Home Safety/Living Situation, Fall Risk, Activities of Daily Living, Independent Activities of Daily Living, Physical Activity,Timed Up and Go test and Whisper test::  *Patient reports NO ROUTINE EXERCISE. (On average, how many days per week do you engage in moderate to strenuous exercise (like a brisk walk)?: (!) 0)  *Patient reports recently FEELING DIZZY, has a FEAR OF FALLING or HAS FALLEN. (In the past four weeks have you felt dizzy when standing up, were afraid of falling or fallen?: (!) Yes)  *Patient reports she NEEDS AMBULATION ASSISTANCE. (Do you use an assistance device to easily get around?: (!) Yes)(Ambulation Assistance: Walker (standard))     The patient reports NO OPIOID PRESCRIPTIONS. This was confirmed through medication reconciliation and the [facility] website.    The patient  is NOT A TOBACCO USER.  The patient reports NO SIGNIFICANT ALCOHOL USE.     All Questions regarding food, transportation or housing were not answered today.    I provided Gwendolyn Trejo with a 5-10 year written Screening Schedule per USPSTF age appropriate recommendations and a Personal Prevention Plan based on the results of today's Health Risk Assessment. Education, counseling, and referrals were provided as documented above and can be viewed in the After Visit Summary.    Follow up in about 3 months (around 6/3/2025) for Insomnia Follow Up with [facility] . In addition to this scheduled follow up, the patient has also been instructed to follow up on as needed basis.     Advance Care Planning     Date: 03/03/2025    Power of   I initiated the process of voluntary advance care planning today and explained the importance of this process to the patient.  I introduced the concept of advance directives to the patient, as well. Then the patient received detailed information about the importance of designating a Health Care Power of  (HCPOA). She was also instructed to communicate with this person about their wishes for future healthcare, should she become sick and lose decision-making capacity. The patient has not previously appointed a HCPOA. After our discussion, the patient has decided to complete a HCPOA and has appointed her daughter, health care agent: Dee Alanis. I encouraged her to communicate with this person about their wishes for future healthcare, should she become sick and lose decision-making capacity.      A total of 5 min was spent on advance care planning, goals of care discussion, emotional support, formulating and communicating prognosis and exploring burden/benefit of various approaches of treatment. This discussion occurred on a fully voluntary basis with the verbal consent of the patient and/or family.

## 2025-03-03 NOTE — ASSESSMENT & PLAN NOTE
Continue Lasix 40 mg BID + Metoprolol 25 mg nightly.   Last ECHO on  showed EF of 55-60 %.    Notify the clinic if you gain 3 or more pounds in one day or 5 or more pounds in one week.  Stressed importance of daily morning weights after urination but prior to breakfast on the same scale.  Low Sodium Diet (Dash Diet - less than 2 grams of sodium per day).  Follow a low cholesterol, low saturated fat diet with less that 200mg of cholesterol a day.  Cut down on alcohol if you have more than 1 drink a day (for women) or 2 drinks a day (for men).  Maintain healthy weight with goal BMI <30. Perform 30 minutes of daily physical activity 5 days per week.  Report to ER for chest pain, SOB, difficulty breathing, abdominal distention or significant edema to lower extremities.  Continue following up with Cardiology - Dr. Marie & Dr. Brantley

## 2025-03-03 NOTE — ASSESSMENT & PLAN NOTE
Monitor kidney function through GFR measurements.  Previous GFR was 46 (stage 3A), while current GFR is greater than 60, indicating improvement to stage 2 chronic kidney disease.  Continue current management.     Lab Results   Component Value Date    EGFRNORACEVR >60 03/03/2025    EGFRNORACEVR 46 08/29/2024     Follow renoprotective measures including Renal Diet (reduce intake of nuts, peanut butter, milk, cheese, dried beans, peas) and Low Sodium Diet (less than 2 grams per day).  Avoid NSAIDs (Aleve, Mobic, Celebrex, Ibuprofen, Advil, Toradol and Diclofenac). May take Tylenol as needed for headache/pain.  Control DM with goal A1C <7. BP goal <130/80. LDL goal < 100.  Stay well hydrated. Avoid alcohol and soda. Limit tea and coffee.

## 2025-04-26 ENCOUNTER — EXTERNAL HOME HEALTH (OUTPATIENT)
Dept: HOME HEALTH SERVICES | Facility: HOSPITAL | Age: 85
End: 2025-04-26
Payer: MEDICARE

## 2025-05-22 DIAGNOSIS — I10 ESSENTIAL HYPERTENSION: ICD-10-CM

## 2025-05-22 DIAGNOSIS — G47.00 INSOMNIA, UNSPECIFIED TYPE: Chronic | ICD-10-CM

## 2025-05-22 RX ORDER — LOSARTAN POTASSIUM 100 MG/1
100 TABLET ORAL
Qty: 90 TABLET | Refills: 1 | Status: SHIPPED | OUTPATIENT
Start: 2025-05-22

## 2025-05-22 RX ORDER — CLONAZEPAM 0.5 MG/1
0.5 TABLET ORAL NIGHTLY
Qty: 30 TABLET | Refills: 2 | Status: SHIPPED | OUTPATIENT
Start: 2025-05-22

## 2025-05-22 RX ORDER — FUROSEMIDE 40 MG/1
TABLET ORAL
Qty: 90 TABLET | Refills: 1 | Status: SHIPPED | OUTPATIENT
Start: 2025-05-22

## 2025-06-06 ENCOUNTER — HOSPITAL ENCOUNTER (OUTPATIENT)
Dept: RADIOLOGY | Facility: HOSPITAL | Age: 85
Discharge: HOME | End: 2025-06-06
Payer: MEDICARE

## 2025-06-06 ENCOUNTER — OFFICE VISIT (OUTPATIENT)
Dept: FAMILY MEDICINE | Facility: CLINIC | Age: 85
End: 2025-06-06
Payer: MEDICARE

## 2025-06-06 ENCOUNTER — RESULTS FOLLOW-UP (OUTPATIENT)
Dept: FAMILY MEDICINE | Facility: CLINIC | Age: 85
End: 2025-06-06

## 2025-06-06 VITALS
HEIGHT: 63 IN | OXYGEN SATURATION: 88 % | SYSTOLIC BLOOD PRESSURE: 158 MMHG | HEART RATE: 87 BPM | RESPIRATION RATE: 20 BRPM | WEIGHT: 271 LBS | TEMPERATURE: 98 F | BODY MASS INDEX: 48.02 KG/M2 | DIASTOLIC BLOOD PRESSURE: 72 MMHG

## 2025-06-06 DIAGNOSIS — R63.5 ABNORMAL WEIGHT GAIN: ICD-10-CM

## 2025-06-06 DIAGNOSIS — R06.02 SOB (SHORTNESS OF BREATH): ICD-10-CM

## 2025-06-06 DIAGNOSIS — I50.33 ACUTE ON CHRONIC HEART FAILURE WITH PRESERVED EJECTION FRACTION (HFPEF): Primary | ICD-10-CM

## 2025-06-06 DIAGNOSIS — I50.33 ACUTE ON CHRONIC HEART FAILURE WITH PRESERVED EJECTION FRACTION (HFPEF): ICD-10-CM

## 2025-06-06 DIAGNOSIS — N18.2 STAGE 2 CHRONIC KIDNEY DISEASE: ICD-10-CM

## 2025-06-06 DIAGNOSIS — I50.1 PULMONARY EDEMA WITH CONGESTIVE HEART FAILURE: ICD-10-CM

## 2025-06-06 PROCEDURE — 71046 X-RAY EXAM CHEST 2 VIEWS: CPT | Mod: TC

## 2025-06-12 ENCOUNTER — HOSPITAL ENCOUNTER (OUTPATIENT)
Dept: RADIOLOGY | Facility: HOSPITAL | Age: 85
Discharge: HOME | End: 2025-06-12
Payer: MEDICARE

## 2025-06-12 DIAGNOSIS — I50.1 PULMONARY EDEMA WITH CONGESTIVE HEART FAILURE: ICD-10-CM

## 2025-06-12 PROCEDURE — 71046 X-RAY EXAM CHEST 2 VIEWS: CPT | Mod: TC

## 2025-06-13 ENCOUNTER — OFFICE VISIT (OUTPATIENT)
Dept: FAMILY MEDICINE | Facility: CLINIC | Age: 85
End: 2025-06-13
Payer: MEDICARE

## 2025-06-13 VITALS
HEIGHT: 63 IN | WEIGHT: 262.81 LBS | HEART RATE: 105 BPM | TEMPERATURE: 98 F | RESPIRATION RATE: 20 BRPM | SYSTOLIC BLOOD PRESSURE: 136 MMHG | DIASTOLIC BLOOD PRESSURE: 76 MMHG | OXYGEN SATURATION: 91 % | BODY MASS INDEX: 46.57 KG/M2

## 2025-06-13 DIAGNOSIS — I50.33 ACUTE ON CHRONIC HEART FAILURE WITH PRESERVED EJECTION FRACTION (HFPEF): ICD-10-CM

## 2025-06-13 DIAGNOSIS — N18.2 STAGE 2 CHRONIC KIDNEY DISEASE: ICD-10-CM

## 2025-06-13 DIAGNOSIS — I50.1 PULMONARY EDEMA WITH CONGESTIVE HEART FAILURE: Primary | ICD-10-CM

## 2025-06-13 PROBLEM — R06.02 SOB (SHORTNESS OF BREATH): Status: RESOLVED | Noted: 2024-01-31 | Resolved: 2025-06-13

## 2025-06-13 RX ORDER — SPIRONOLACTONE 25 MG/1
25 TABLET ORAL DAILY
COMMUNITY

## 2025-06-13 RX ORDER — DOXAZOSIN 2 MG/1
2 TABLET ORAL NIGHTLY
COMMUNITY
Start: 2025-06-10

## 2025-06-13 NOTE — ASSESSMENT & PLAN NOTE
Explained that the patient's shortness of breath was likely due to fluid retention rather than allergies.  Gwendolyn is not experiencing shortness of breath today, with previous episodes improving after increasing furosemide to twice daily.  Blood pressure shows improvement today, correlating with symptom resolution.  Discussed the importance of monitoring shortness of breath as an indicator of fluid accumulation.  Instructed the patient to contact the office if shortness of breath or weakness returns.

## 2025-06-13 NOTE — PROGRESS NOTES
Patient ID: [id number]     Chief Complaint: Follow-up (1 wk)    HPI:     Gwendolyn presents today for follow up of shortness of breath. Last Friday, Lasix was increased to 40 mg BID due to the patient having pulmonary edema. CXR this week showed resolution of fluid. Kidney function is stable.     WEIGHT MANAGEMENT:  Her weight has decreased from 271 to 262 lbs, attributed to fluid reduction. She reports increased urination.    CURRENT MEDICATIONS:  She takes Furosemide (Lasix) 40 mg daily in morning and Doxazosin 2 mg nightly. The patient's daughter daughter reports that Dr. Marie added Spirolactone 25 mg daily to her regimen. She is unsure if the patient is to continue taking Lasix 40 mg BID.        Past Medical History:   Diagnosis Date    Anemia of chronic disease     Anxiety disorder     Aortic stenosis     Atrial fibrillation     CAD (coronary artery disease)     Congestive heart failure (CHF)     Coronary arteriosclerosis     Crushing injury of right foot     Diastolic dysfunction     Diverticulosis     History of Coumadin therapy     History of transient ischemic attack (TIA)     HTN (hypertension)     Hypoxia     JOAQUIN (iron deficiency anemia)     Inflamed seborrheic keratosis     Insomnia     Lumbar radiculopathy     Mitral regurgitation     Neovascular age-related macular degeneration     Nodular calcific aortic valve stenosis     Pseudophakia of both eyes     Renal scarring     S/P TAVR (transcatheter aortic valve replacement) 2023    Sciatica     Severe aortic stenosis     SOB (shortness of breath) 2024    Transient cerebral ischemia         Past Surgical History:   Procedure Laterality Date    ANKLE GANGLION CYST EXCISION Left     CARDIAC CATHETERIZATION  2013    Dr Yadav    CATARACT EXTRACTION Bilateral      SECTION       SECTION      TRANSCATHETER AORTIC VALVE REPLACEMENT (TAVR)  2023    Dr Mauricio Horn        Social History     Socioeconomic History     Marital status:    Tobacco Use    Smoking status: Never    Smokeless tobacco: Never   Substance and Sexual Activity    Alcohol use: Never    Drug use: Never    Sexual activity: Not Currently     Social Drivers of Health     Financial Resource Strain: Medium Risk (8/22/2024)    Overall Financial Resource Strain (CARDIA)     Difficulty of Paying Living Expenses: Somewhat hard   Food Insecurity: Food Insecurity Present (8/22/2024)    Hunger Vital Sign     Worried About Running Out of Food in the Last Year: Sometimes true     Ran Out of Food in the Last Year: Sometimes true   Transportation Needs: Patient Unable To Answer (2/2/2024)    PRAPARE - Transportation     Lack of Transportation (Medical): Patient unable to answer     Lack of Transportation (Non-Medical): Patient unable to answer   Physical Activity: Inactive (8/22/2024)    Exercise Vital Sign     Days of Exercise per Week: 0 days     Minutes of Exercise per Session: 0 min   Stress: Stress Concern Present (8/22/2024)    [employer] - Occupational Stress Questionnaire     Feeling of Stress : To some extent   Housing Stability: Unknown (8/22/2024)    Housing Stability Vital Sign     Unable to Pay for Housing in the Last Year: No        Current Outpatient Medications   Medication Instructions    amLODIPine (NORVASC) 5 mg, Daily    clonazePAM (KLONOPIN) 0.5 mg, Oral, Nightly    doxazosin (CARDURA) 2 mg, Nightly    ELIQUIS 5 mg, 2 times daily    furosemide (LASIX) 40 MG tablet TAKE ONE TABLET BY MOUTH TWICE DAILY FOR for FLUID    hydrALAZINE (APRESOLINE) 25 mg, 3 times daily    losartan (COZAAR) 100 mg, Oral    metoprolol succinate (TOPROL-XL) 25 mg, Nightly    spironolactone (ALDACTONE) 25 mg, Daily       Review of patient's allergies indicates:   Allergen Reactions    Penicillins Hives    Penicillin         Patient Care Team:  Estuardo Soriano DO as PCP - General (Family Medicine)  Salbador Scott Jr., MD as Consulting  "Physician (Ophthalmology)  Daniele Marie MD as Consulting Physician (Cardiology)  Jose Yadav MD as Consulting Physician (Vascular Surgery)  Mauricio Horn MD as Consulting Physician (Cardiology)  Steven Lozada - (Home Health Services)  Tye Garcia DO (Orthopedic Surgery)     Subjective:     Review of Systems    12 point review of systems conducted, negative except as stated in the history of present illness. See HPI for details.    Objective:     Visit Vitals  /76 (BP Location: Right arm, Patient Position: Sitting)   Pulse 105   Temp 97.7 °F (36.5 °C)   Resp 20   Ht 5' 3" (1.6 m)   Wt 119.2 kg (262 lb 12.8 oz)   SpO2 (!) 91%   BMI 46.55 kg/m²       Physical Exam  Constitutional:       Appearance: Normal appearance.   HENT:      Head: Normocephalic.      Nose: Nose normal.   Eyes:      Extraocular Movements: Extraocular movements intact.      Conjunctiva/sclera: Conjunctivae normal.   Cardiovascular:      Rate and Rhythm: Normal rate and regular rhythm.      Pulses: Normal pulses.   Pulmonary:      Effort: Pulmonary effort is normal.      Breath sounds: Normal breath sounds and air entry. No decreased breath sounds, wheezing or rhonchi.   Musculoskeletal:         General: Normal range of motion.      Cervical back: Normal range of motion and neck supple.   Skin:     General: Skin is warm and dry.   Neurological:      Mental Status: She is alert and oriented to person, place, and time. Mental status is at baseline.   Psychiatric:         Mood and Affect: Mood normal.         Behavior: Behavior normal.         Thought Content: Thought content normal.         Judgment: Judgment normal.     X-Ray Chest PA And Lateral  Order: [id number]   Status: Final result       Next appt: 12/11/2025 at 08:20 AM in Family Medicine (Estuardo Soriano DO)       Dx: Pulmonary edema with congestive heart...    Test Result Released: Yes (seen)    0 Result Notes  Details    Reading Physician " Reading Date Result Priority   Wilner Araiza MD  [phone]  6/12/2025 Routine     Narrative & Impression  EXAMINATION:  XR CHEST PA AND LATERAL     CLINICAL HISTORY:  , Left ventricular failure, unspecified.     FINDINGS:  No alveolar consolidation, effusion, or pneumothorax is seen.   The thoracic aorta is normal  cardiac silhouette is enlarged, central pulmonary vessels and mediastinum are normal in size and are grossly unremarkable.   visualized osseous structures are grossly unremarkable.     Impression:     No acute chest disease is identified.     Cardiomegaly        Electronically signed by:Wilner Araiza  Date:                                            06/12/2025  Time:                                           06:53     Labs Reviewed:     Chemistry:  Lab Results   Component Value Date     06/12/2025    K 3.9 06/12/2025    BUN 26.0 (H) 06/12/2025    CREATININE 0.90 06/12/2025    EGFRNORACEVR >60 06/12/2025    CALCIUM 9.2 06/12/2025    ALKPHOS 51 03/03/2025    LABPROT 13.8 04/20/2023    ALBUMIN 4.0 03/03/2025    BILIDIR 0.4 12/22/2020    IBILI 0.60 12/22/2020    AST 15 03/03/2025    ALT 12 03/03/2025    MG 2.60 02/01/2024    [id number] 26.4 (L) 01/23/2024    TSH 1.567 03/03/2025      Assessment:       ICD-10-CM ICD-9-CM   1. Pulmonary edema with congestive heart failure  I50.1 428.0   2. Acute on chronic heart failure with preserved ejection fraction (HFpEF)  I50.33 428.23   3. Stage 2 chronic kidney disease  N18.2 585.2        Plan:     1. Pulmonary edema with congestive heart failure  Assessment & Plan:  Explained that the patient's shortness of breath was likely due to fluid retention rather than allergies.  Gwendolyn is not experiencing shortness of breath today, with previous episodes improving after increasing furosemide to twice daily.  Blood pressure shows improvement today, correlating with symptom resolution.  Discussed the importance of monitoring shortness of breath as an  indicator of fluid accumulation.  Instructed the patient to contact the office if shortness of breath or weakness returns.      2. Acute on chronic heart failure with preserved ejection fraction (HFpEF)  Assessment & Plan:  Explained that fluid retention was likely causing the patient's symptoms.  Noted weight decrease from 271 to 262 lbs (9 pounds), indicating effective diuresis with improvement in fluid retention as evidenced by reduced dyspnea.  Instructed the patient to contact the office if weakness recurs.      3. Stage 2 chronic kidney disease  Assessment & Plan:  Dr. Marie initiated spironolactone (Aldactone) 25 mg daily, continued furosemide (Lasix) 40 mg every morning, and maintained doxazosin 2 mg at night.  Kidney function remains stable and within acceptable parameters.  Ms. Sosared to complete BMP prior to seeing Dr. Marie.   Scheduled follow-up with Dr. Soriano in December and ensured follow-up with Dr. Marie as scheduled.        Follow up for Keep Scheduled Appointment.. In addition to their scheduled follow up, the patient has also been instructed to follow up on as needed basis.     This note was generated with the assistance of ambient listening technology. Verbal consent was obtained by the patient and accompanying visitor(s) for the recording of patient appointment to facilitate this note. I attest to having reviewed and edited the generated note for accuracy, though some syntax or spelling errors may persist. Please contact the author of this note for any clarification.      Sparkle Espinosa, Adult-Gerontology NP

## 2025-06-13 NOTE — ASSESSMENT & PLAN NOTE
Dr. Marie initiated spironolactone (Aldactone) 25 mg daily, continued furosemide (Lasix) 40 mg every morning, and maintained doxazosin 2 mg at night.  Kidney function remains stable and within acceptable parameters.  Ms. Sosared to complete BMP prior to seeing Dr. Marie.   Scheduled follow-up with Dr. Soriano in December and ensured follow-up with Dr. Marie as scheduled.

## 2025-06-13 NOTE — ASSESSMENT & PLAN NOTE
Explained that fluid retention was likely causing the patient's symptoms.  Noted weight decrease from 271 to 262 lbs (9 pounds), indicating effective diuresis with improvement in fluid retention as evidenced by reduced dyspnea.  Instructed the patient to contact the office if weakness recurs.

## 2025-07-03 ENCOUNTER — LAB VISIT (OUTPATIENT)
Dept: LAB | Facility: HOSPITAL | Age: 85
End: 2025-07-03
Attending: INTERNAL MEDICINE
Payer: MEDICARE

## 2025-07-03 DIAGNOSIS — I10 ESSENTIAL HYPERTENSION, MALIGNANT: Primary | ICD-10-CM

## 2025-07-03 DIAGNOSIS — R06.02 SHORTNESS OF BREATH: ICD-10-CM

## 2025-07-03 DIAGNOSIS — R60.9 EDEMA, UNSPECIFIED TYPE: ICD-10-CM

## 2025-07-03 LAB
ANION GAP SERPL CALC-SCNC: 7 MEQ/L
BUN SERPL-MCNC: 41 MG/DL (ref 9.8–20.1)
CALCIUM SERPL-MCNC: 9.5 MG/DL (ref 8.4–10.2)
CHLORIDE SERPL-SCNC: 101 MMOL/L (ref 98–107)
CO2 SERPL-SCNC: 30 MMOL/L (ref 23–31)
CREAT SERPL-MCNC: 1.02 MG/DL (ref 0.55–1.02)
CREAT/UREA NIT SERPL: 40
GFR SERPLBLD CREATININE-BSD FMLA CKD-EPI: 54 ML/MIN/1.73/M2
GLUCOSE SERPL-MCNC: 154 MG/DL (ref 82–115)
POTASSIUM SERPL-SCNC: 4.7 MMOL/L (ref 3.5–5.1)
SODIUM SERPL-SCNC: 138 MMOL/L (ref 136–145)

## 2025-07-03 PROCEDURE — 36415 COLL VENOUS BLD VENIPUNCTURE: CPT

## 2025-07-03 PROCEDURE — 80048 BASIC METABOLIC PNL TOTAL CA: CPT

## 2025-07-14 ENCOUNTER — EXTERNAL HOME HEALTH (OUTPATIENT)
Dept: HOME HEALTH SERVICES | Facility: HOSPITAL | Age: 85
End: 2025-07-14
Payer: MEDICARE

## 2025-07-23 ENCOUNTER — LAB REQUISITION (OUTPATIENT)
Dept: LAB | Facility: HOSPITAL | Age: 85
End: 2025-07-23
Payer: MEDICARE

## 2025-07-23 ENCOUNTER — TELEPHONE (OUTPATIENT)
Dept: FAMILY MEDICINE | Facility: CLINIC | Age: 85
End: 2025-07-23
Payer: MEDICARE

## 2025-07-23 DIAGNOSIS — R39.9 UTI SYMPTOMS: Primary | ICD-10-CM

## 2025-07-23 DIAGNOSIS — R35.0 URINARY FREQUENCY: ICD-10-CM

## 2025-07-23 DIAGNOSIS — R41.82 ALTERED MENTAL STATUS, UNSPECIFIED: ICD-10-CM

## 2025-07-23 LAB
BACTERIA #/AREA URNS AUTO: ABNORMAL /HPF
BILIRUB UR QL STRIP.AUTO: NEGATIVE
CLARITY UR: CLEAR
COLOR UR AUTO: YELLOW
GLUCOSE UR QL STRIP: NEGATIVE
HGB UR QL STRIP: NEGATIVE
KETONES UR QL STRIP: NEGATIVE
LEUKOCYTE ESTERASE UR QL STRIP: ABNORMAL
NITRITE UR QL STRIP: POSITIVE
PH UR STRIP: 6 [PH]
PROT UR QL STRIP: NEGATIVE
RBC #/AREA URNS AUTO: ABNORMAL /HPF
SP GR UR STRIP.AUTO: 1.01 (ref 1–1.03)
SQUAMOUS #/AREA URNS AUTO: ABNORMAL /HPF
UROBILINOGEN UR STRIP-ACNC: 0.2
WBC #/AREA URNS AUTO: ABNORMAL /HPF

## 2025-07-23 PROCEDURE — 81003 URINALYSIS AUTO W/O SCOPE: CPT

## 2025-07-23 PROCEDURE — 87086 URINE CULTURE/COLONY COUNT: CPT

## 2025-07-23 NOTE — TELEPHONE ENCOUNTER
Copied from CRM #[id number]. Topic: General Inquiry - Patient Advice  >> Jul 23, 2025 11:56 AM Debbie wrote:  Who Called: [facility]    Caller is requesting assistance/information from provider's office.    Symptoms (please be specific): UTI   How long has patient had these symptoms:    List of preferred pharmacies on file (remove unneeded): [unfilled]  If different, enter pharmacy into here including location and phone number:       Preferred Method of Contact: Phone Call  Patient's Preferred Phone Number on File: [phone]   Best Call Back Number, if different:[phone]  Additional Information: States they have a concern of having possible UTI. Would like to discuss getting an order for a urine analysis.

## 2025-07-24 ENCOUNTER — TELEPHONE (OUTPATIENT)
Dept: FAMILY MEDICINE | Facility: CLINIC | Age: 85
End: 2025-07-24
Payer: MEDICARE

## 2025-07-24 DIAGNOSIS — N39.0 URINARY TRACT INFECTION WITHOUT HEMATURIA, SITE UNSPECIFIED: Primary | ICD-10-CM

## 2025-07-24 DIAGNOSIS — N30.00 ACUTE CYSTITIS WITHOUT HEMATURIA: Primary | ICD-10-CM

## 2025-07-24 RX ORDER — NITROFURANTOIN 25; 75 MG/1; MG/1
100 CAPSULE ORAL 2 TIMES DAILY
Qty: 14 CAPSULE | Refills: 0 | Status: SHIPPED | OUTPATIENT
Start: 2025-07-24 | End: 2025-07-31

## 2025-07-24 NOTE — TELEPHONE ENCOUNTER
Copied from CRM #[id number]. Topic: General Inquiry - Patient Advice  >> Jul 24, 2025  1:25 PM Debbie wrote:  Who Called: Dee (daughter)    Caller is requesting assistance/information from provider's office.    Symptoms (please be specific): none   How long has patient had these symptoms:    List of preferred pharmacies on file (remove unneeded): [unfilled]  If different, enter pharmacy into here including location and phone number:       Preferred Method of Contact: Phone Call  Patient's Preferred Phone Number on File: [phone]   Best Call Back Number, if different:  Additional Information: Dee is requesting a call back from nurse regarding results.

## 2025-07-24 NOTE — TELEPHONE ENCOUNTER
Notified pt. of results and order for Macrobid. Also notified Susanne BUENROSTRO with [facility].

## 2025-07-26 LAB — BACTERIA UR CULT: ABNORMAL
